# Patient Record
Sex: MALE | Race: BLACK OR AFRICAN AMERICAN
[De-identification: names, ages, dates, MRNs, and addresses within clinical notes are randomized per-mention and may not be internally consistent; named-entity substitution may affect disease eponyms.]

---

## 2017-03-25 ENCOUNTER — HOSPITAL ENCOUNTER (EMERGENCY)
Dept: HOSPITAL 8 - ED | Age: 27
LOS: 1 days | Discharge: HOME | End: 2017-03-26
Payer: MEDICAID

## 2017-03-25 VITALS — HEIGHT: 70 IN | WEIGHT: 220.68 LBS | BODY MASS INDEX: 31.59 KG/M2

## 2017-03-25 VITALS — SYSTOLIC BLOOD PRESSURE: 140 MMHG | DIASTOLIC BLOOD PRESSURE: 86 MMHG

## 2017-03-25 DIAGNOSIS — S62.314A: Primary | ICD-10-CM
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
## 2017-03-25 DIAGNOSIS — F17.200: ICD-10-CM
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
## 2017-03-25 DIAGNOSIS — S62.316A: ICD-10-CM

## 2017-03-25 DIAGNOSIS — S62.141A: ICD-10-CM

## 2017-03-25 PROCEDURE — 29125 APPL SHORT ARM SPLINT STATIC: CPT

## 2017-03-25 PROCEDURE — 99284 EMERGENCY DEPT VISIT MOD MDM: CPT

## 2017-11-14 ENCOUNTER — HOSPITAL ENCOUNTER (EMERGENCY)
Facility: MEDICAL CENTER | Age: 27
End: 2017-11-14
Attending: EMERGENCY MEDICINE
Payer: MEDICAID

## 2017-11-14 VITALS
BODY MASS INDEX: 32.53 KG/M2 | WEIGHT: 232.37 LBS | OXYGEN SATURATION: 100 % | TEMPERATURE: 97.7 F | HEART RATE: 80 BPM | DIASTOLIC BLOOD PRESSURE: 74 MMHG | HEIGHT: 71 IN | RESPIRATION RATE: 18 BRPM | SYSTOLIC BLOOD PRESSURE: 132 MMHG

## 2017-11-14 DIAGNOSIS — K08.89 PAIN, DENTAL: ICD-10-CM

## 2017-11-14 DIAGNOSIS — S02.5XXA CLOSED FRACTURE OF TOOTH, INITIAL ENCOUNTER: ICD-10-CM

## 2017-11-14 PROCEDURE — 99283 EMERGENCY DEPT VISIT LOW MDM: CPT

## 2017-11-14 PROCEDURE — A9270 NON-COVERED ITEM OR SERVICE: HCPCS | Performed by: EMERGENCY MEDICINE

## 2017-11-14 PROCEDURE — 700102 HCHG RX REV CODE 250 W/ 637 OVERRIDE(OP): Performed by: EMERGENCY MEDICINE

## 2017-11-14 RX ORDER — OXYCODONE HYDROCHLORIDE AND ACETAMINOPHEN 5; 325 MG/1; MG/1
1 TABLET ORAL ONCE
Status: COMPLETED | OUTPATIENT
Start: 2017-11-14 | End: 2017-11-14

## 2017-11-14 RX ORDER — IBUPROFEN 600 MG/1
600 TABLET ORAL ONCE
Status: COMPLETED | OUTPATIENT
Start: 2017-11-14 | End: 2017-11-14

## 2017-11-14 RX ORDER — PENICILLIN V POTASSIUM 500 MG/1
500 TABLET ORAL ONCE
Status: COMPLETED | OUTPATIENT
Start: 2017-11-14 | End: 2017-11-14

## 2017-11-14 RX ORDER — PENICILLIN V POTASSIUM 500 MG/1
500 TABLET ORAL 4 TIMES DAILY
Qty: 40 TAB | Refills: 0 | Status: SHIPPED | OUTPATIENT
Start: 2017-11-14 | End: 2017-11-24

## 2017-11-14 RX ORDER — OXYCODONE AND ACETAMINOPHEN 7.5; 325 MG/1; MG/1
1 TABLET ORAL EVERY 6 HOURS PRN
Qty: 10 TAB | Refills: 0 | Status: SHIPPED | OUTPATIENT
Start: 2017-11-14 | End: 2018-10-04

## 2017-11-14 RX ADMIN — PENICILLIN V POTASIUM 500 MG: 500 TABLET OROPHARYNGEAL at 07:59

## 2017-11-14 RX ADMIN — OXYCODONE HYDROCHLORIDE AND ACETAMINOPHEN 1 TABLET: 5; 325 TABLET ORAL at 07:59

## 2017-11-14 RX ADMIN — IBUPROFEN 600 MG: 600 TABLET, FILM COATED ORAL at 07:59

## 2017-11-14 ASSESSMENT — ENCOUNTER SYMPTOMS
VOMITING: 0
FEVER: 0
COUGH: 0

## 2017-11-14 ASSESSMENT — LIFESTYLE VARIABLES: DO YOU DRINK ALCOHOL: NO

## 2017-11-14 NOTE — ED NOTES
Chief Complaint   Patient presents with   • Dental Pain     Pt reports left side/upper jaw pain/swelling since yesterday/ pt reports to have broken tooth/ denies drainage/fever.    • Ear Pain     Explained to pt triage process, made pt aware to tell this RN of any changes/concerns, pt verbalized understanding of process and instructions given. Pt to ER lobby.

## 2017-11-14 NOTE — ED PROVIDER NOTES
ED Provider Note    ED Provider Note    Scribed for Belen Goodrich D.O. by Belen Goodrich. 11/14/2017, 7:31 AM.    Primary care provider: Pcp Pt States None  Means of arrival: POV  History obtained from: Patient  History limited by: None    CHIEF COMPLAINT  Chief Complaint   Patient presents with   • Dental Pain     Pt reports left side/upper jaw pain/swelling since yesterday/ pt reports to have broken tooth/ denies drainage/fever.    • Ear Pain       HPI  Harman Denise is a 27 y.o. male who presents to the Emergency DepartmentWith a chief complaint of dental pain. Patient's pain is on the left bottom. He states he broke that tooth sometime ago. Pain started about 48 hours ago. He actually has an appointment on January 9 at the [facility], to have this tooth pulled. He denies any fever or difficulty swallowing. He is otherwise been in his normal state of health. He was previously seen in the emergency department here in August, patient reports that he followed up with the [facility] clinic and had this dental problem resolved it was previously on the right side.    REVIEW OF SYSTEMS  Review of Systems   Constitutional: Negative for fever.   Respiratory: Negative for cough.    Gastrointestinal: Negative for vomiting.       PAST MEDICAL HISTORY   she denies any past medical history.    SURGICAL HISTORY   has a past surgical history that includes laceration repair (6/2/2014) and foreign body removal (6/2/2014).    SOCIAL HISTORY  Social History   Substance Use Topics   • Smoking status: Current Every Day Smoker     Packs/day: 1.00     Types: Cigarettes   • Smokeless tobacco: Not on file   • Alcohol use Yes      Comment: occ      History   Drug Use     Comment: marijuana       FAMILY HISTORY  No family history on file.    CURRENT MEDICATIONS  Home Medications    **Home medications have not yet been reviewed for this encounter**         ALLERGIES  No Known Allergies    PHYSICAL EXAM  VITAL SIGNS: /74   Pulse 80   Temp  "36.5 °C (97.7 °F)   Resp 18   Ht 1.803 m (5' 11\")   Wt 105.4 kg (232 lb 5.8 oz)   SpO2 100%   BMI 32.41 kg/m²   Vitals reviewed.  Constitutional: Patient is oriented to person, place, and time. Appears well-developed and well-nourished. No distress.    Head: Normocephalic and atraumatic. Mild left facial swelling  Mouth/Throat: Oropharynx is clear and moist, no exudates.  no evidence of dental abscess. The patient's left top, 1st molar, is cracked on the anterior surface. No swelling to the floor of the mouth. The patient's managing his own secretions.  Eyes: Conjunctivae are normal.   Cardiovascular: Normal rate, regular rhythm and normal heart sounds.  Pulmonary/Chest: Effort normal and breath sounds normal. No respiratory distress.  Lymphadenopathy: No cervical adenopathy.   Skin: Skin is warm and dry. No erythema. No pallor.   Psychiatric: Patient has a normal mood and affect.    COURSE & MEDICAL DECISION MAKING  Nursing notes, VS, PMSFHx reviewed in chart.    Obtained and reviewed past medical records. Last visit was August 2016 for right-sided dental pain.    7:31 AM - Patient seen and examined at bedside. Well-appearing and nontoxic. He has mild left facial swelling. No evidence of dental abscess. Managing his own secretions. Nontoxic. Normal vital signs. At this time, I think he could benefit from antibiotics. He'll be discharged home in stable condition. He has follow-up set up with a dentist of encouraged him to see them sooner if possible. Other Wise, patient will be discharged to home in stable condition.      checked, no concerning filling pattern.    The patient is referred to a primary physician for blood pressure management, diabetic screening, and for all other preventative health concerns.    DISPOSITION:  Patient will be discharged home in stable condition.    FOLLOW UP:  [facility]  [facility]  [place] [address]  [phone]      as planned, soon if possible    [facility], Emergency Dept  [address] [address]  [phone]    If symptoms worsen      OUTPATIENT MEDICATIONS:  Discharge Medication List as of 11/14/2017  8:01 AM      START taking these medications    Details   penicillin v potassium (VEETID) 500 MG Tab Take 1 Tab by mouth 4 times a day for 10 days., Disp-40 Tab, R-0, Print Rx Paper      oxycodone-acetaminophen (PERCOCET) 7.5-325 MG per tablet Take 1 Tab by mouth every 6 hours as needed for up to 10 doses., Disp-10 Tab, R-0, Print Rx Paper               FINAL IMPRESSION  1. Pain, dental    2. Closed fracture of tooth, initial encounter

## 2017-11-14 NOTE — ED NOTES
Discharge instructions given, pt verbalized understanding.  Prescription instructions given, pt verbalized understanding.  Understands NOT to drive or drink alcohol while taking narcotics.  A&ox4.  VSS.  Ambulates out of ER.  States he will not drive home.

## 2018-10-04 ENCOUNTER — APPOINTMENT (OUTPATIENT)
Dept: RADIOLOGY | Facility: MEDICAL CENTER | Age: 28
DRG: 158 | End: 2018-10-04
Attending: EMERGENCY MEDICINE
Payer: MEDICAID

## 2018-10-04 ENCOUNTER — HOSPITAL ENCOUNTER (INPATIENT)
Facility: MEDICAL CENTER | Age: 28
LOS: 2 days | DRG: 158 | End: 2018-10-06
Attending: EMERGENCY MEDICINE | Admitting: HOSPITALIST
Payer: MEDICAID

## 2018-10-04 DIAGNOSIS — K04.7 DENTAL ABSCESS: ICD-10-CM

## 2018-10-04 LAB
ALBUMIN SERPL BCP-MCNC: 4.2 G/DL (ref 3.2–4.9)
ALBUMIN/GLOB SERPL: 1 G/DL
ALP SERPL-CCNC: 94 U/L (ref 30–99)
ALT SERPL-CCNC: 19 U/L (ref 2–50)
ANION GAP SERPL CALC-SCNC: 7 MMOL/L (ref 0–11.9)
AST SERPL-CCNC: 18 U/L (ref 12–45)
BASOPHILS # BLD AUTO: 0.3 % (ref 0–1.8)
BASOPHILS # BLD: 0.02 K/UL (ref 0–0.12)
BILIRUB SERPL-MCNC: 0.4 MG/DL (ref 0.1–1.5)
BUN SERPL-MCNC: 11 MG/DL (ref 8–22)
CALCIUM SERPL-MCNC: 9.9 MG/DL (ref 8.5–10.5)
CHLORIDE SERPL-SCNC: 106 MMOL/L (ref 96–112)
CO2 SERPL-SCNC: 26 MMOL/L (ref 20–33)
CREAT SERPL-MCNC: 0.9 MG/DL (ref 0.5–1.4)
EOSINOPHIL # BLD AUTO: 0.18 K/UL (ref 0–0.51)
EOSINOPHIL NFR BLD: 2.6 % (ref 0–6.9)
ERYTHROCYTE [DISTWIDTH] IN BLOOD BY AUTOMATED COUNT: 43.1 FL (ref 35.9–50)
GLOBULIN SER CALC-MCNC: 4.3 G/DL (ref 1.9–3.5)
GLUCOSE SERPL-MCNC: 108 MG/DL (ref 65–99)
HCT VFR BLD AUTO: 41.8 % (ref 42–52)
HGB BLD-MCNC: 13.5 G/DL (ref 14–18)
IMM GRANULOCYTES # BLD AUTO: 0.02 K/UL (ref 0–0.11)
IMM GRANULOCYTES NFR BLD AUTO: 0.3 % (ref 0–0.9)
LACTATE BLD-SCNC: 1.1 MMOL/L (ref 0.5–2)
LYMPHOCYTES # BLD AUTO: 1.37 K/UL (ref 1–4.8)
LYMPHOCYTES NFR BLD: 19.4 % (ref 22–41)
MAGNESIUM SERPL-MCNC: 1.9 MG/DL (ref 1.5–2.5)
MCH RBC QN AUTO: 29.7 PG (ref 27–33)
MCHC RBC AUTO-ENTMCNC: 32.3 G/DL (ref 33.7–35.3)
MCV RBC AUTO: 92.1 FL (ref 81.4–97.8)
MONOCYTES # BLD AUTO: 0.52 K/UL (ref 0–0.85)
MONOCYTES NFR BLD AUTO: 7.4 % (ref 0–13.4)
NEUTROPHILS # BLD AUTO: 4.94 K/UL (ref 1.82–7.42)
NEUTROPHILS NFR BLD: 70 % (ref 44–72)
NRBC # BLD AUTO: 0 K/UL
NRBC BLD-RTO: 0 /100 WBC
PLATELET # BLD AUTO: 296 K/UL (ref 164–446)
PMV BLD AUTO: 10 FL (ref 9–12.9)
POTASSIUM SERPL-SCNC: 3.9 MMOL/L (ref 3.6–5.5)
PROT SERPL-MCNC: 8.5 G/DL (ref 6–8.2)
RBC # BLD AUTO: 4.54 M/UL (ref 4.7–6.1)
SODIUM SERPL-SCNC: 139 MMOL/L (ref 135–145)
TSH SERPL DL<=0.005 MIU/L-ACNC: 0.33 UIU/ML (ref 0.38–5.33)
WBC # BLD AUTO: 7.1 K/UL (ref 4.8–10.8)

## 2018-10-04 PROCEDURE — 700111 HCHG RX REV CODE 636 W/ 250 OVERRIDE (IP): Performed by: EMERGENCY MEDICINE

## 2018-10-04 PROCEDURE — 99223 1ST HOSP IP/OBS HIGH 75: CPT | Performed by: HOSPITALIST

## 2018-10-04 PROCEDURE — 84443 ASSAY THYROID STIM HORMONE: CPT

## 2018-10-04 PROCEDURE — 80053 COMPREHEN METABOLIC PANEL: CPT

## 2018-10-04 PROCEDURE — 99285 EMERGENCY DEPT VISIT HI MDM: CPT

## 2018-10-04 PROCEDURE — 700105 HCHG RX REV CODE 258: Performed by: EMERGENCY MEDICINE

## 2018-10-04 PROCEDURE — 96375 TX/PRO/DX INJ NEW DRUG ADDON: CPT

## 2018-10-04 PROCEDURE — 96365 THER/PROPH/DIAG IV INF INIT: CPT

## 2018-10-04 PROCEDURE — 70355 PANORAMIC X-RAY OF JAWS: CPT

## 2018-10-04 PROCEDURE — 700111 HCHG RX REV CODE 636 W/ 250 OVERRIDE (IP): Performed by: HOSPITALIST

## 2018-10-04 PROCEDURE — 700117 HCHG RX CONTRAST REV CODE 255: Performed by: EMERGENCY MEDICINE

## 2018-10-04 PROCEDURE — 770006 HCHG ROOM/CARE - MED/SURG/GYN SEMI*

## 2018-10-04 PROCEDURE — 70491 CT SOFT TISSUE NECK W/DYE: CPT

## 2018-10-04 PROCEDURE — 85025 COMPLETE CBC W/AUTO DIFF WBC: CPT

## 2018-10-04 PROCEDURE — 83735 ASSAY OF MAGNESIUM: CPT

## 2018-10-04 PROCEDURE — 83605 ASSAY OF LACTIC ACID: CPT

## 2018-10-04 PROCEDURE — 36415 COLL VENOUS BLD VENIPUNCTURE: CPT

## 2018-10-04 PROCEDURE — 700105 HCHG RX REV CODE 258: Performed by: HOSPITALIST

## 2018-10-04 RX ORDER — ONDANSETRON 2 MG/ML
4 INJECTION INTRAMUSCULAR; INTRAVENOUS EVERY 4 HOURS PRN
Status: DISCONTINUED | OUTPATIENT
Start: 2018-10-04 | End: 2018-10-06 | Stop reason: HOSPADM

## 2018-10-04 RX ORDER — BISACODYL 10 MG
10 SUPPOSITORY, RECTAL RECTAL
Status: DISCONTINUED | OUTPATIENT
Start: 2018-10-04 | End: 2018-10-06 | Stop reason: HOSPADM

## 2018-10-04 RX ORDER — PROMETHAZINE HYDROCHLORIDE 25 MG/1
12.5-25 TABLET ORAL EVERY 4 HOURS PRN
Status: DISCONTINUED | OUTPATIENT
Start: 2018-10-04 | End: 2018-10-06 | Stop reason: HOSPADM

## 2018-10-04 RX ORDER — SODIUM CHLORIDE 9 MG/ML
INJECTION, SOLUTION INTRAVENOUS CONTINUOUS
Status: DISCONTINUED | OUTPATIENT
Start: 2018-10-04 | End: 2018-10-06 | Stop reason: HOSPADM

## 2018-10-04 RX ORDER — KETOROLAC TROMETHAMINE 30 MG/ML
30 INJECTION, SOLUTION INTRAMUSCULAR; INTRAVENOUS EVERY 6 HOURS PRN
Status: DISCONTINUED | OUTPATIENT
Start: 2018-10-04 | End: 2018-10-04

## 2018-10-04 RX ORDER — ONDANSETRON 4 MG/1
4 TABLET, ORALLY DISINTEGRATING ORAL EVERY 4 HOURS PRN
Status: DISCONTINUED | OUTPATIENT
Start: 2018-10-04 | End: 2018-10-06 | Stop reason: HOSPADM

## 2018-10-04 RX ORDER — KETOROLAC TROMETHAMINE 30 MG/ML
30 INJECTION, SOLUTION INTRAMUSCULAR; INTRAVENOUS EVERY 6 HOURS PRN
Status: DISCONTINUED | OUTPATIENT
Start: 2018-10-04 | End: 2018-10-06 | Stop reason: HOSPADM

## 2018-10-04 RX ORDER — PROMETHAZINE HYDROCHLORIDE 12.5 MG/1
12.5-25 SUPPOSITORY RECTAL EVERY 4 HOURS PRN
Status: DISCONTINUED | OUTPATIENT
Start: 2018-10-04 | End: 2018-10-06 | Stop reason: HOSPADM

## 2018-10-04 RX ORDER — AMOXICILLIN 250 MG
2 CAPSULE ORAL 2 TIMES DAILY
Status: DISCONTINUED | OUTPATIENT
Start: 2018-10-05 | End: 2018-10-06 | Stop reason: HOSPADM

## 2018-10-04 RX ORDER — SODIUM CHLORIDE 9 MG/ML
1000 INJECTION, SOLUTION INTRAVENOUS ONCE
Status: COMPLETED | OUTPATIENT
Start: 2018-10-04 | End: 2018-10-04

## 2018-10-04 RX ORDER — ONDANSETRON 2 MG/ML
4 INJECTION INTRAMUSCULAR; INTRAVENOUS ONCE
Status: COMPLETED | OUTPATIENT
Start: 2018-10-04 | End: 2018-10-04

## 2018-10-04 RX ORDER — IBUPROFEN 200 MG
800 TABLET ORAL
Status: ON HOLD | COMMUNITY
End: 2018-10-06

## 2018-10-04 RX ORDER — MORPHINE SULFATE 4 MG/ML
4 INJECTION, SOLUTION INTRAMUSCULAR; INTRAVENOUS ONCE
Status: COMPLETED | OUTPATIENT
Start: 2018-10-04 | End: 2018-10-04

## 2018-10-04 RX ORDER — ACETAMINOPHEN 500 MG
500 TABLET ORAL EVERY 6 HOURS PRN
Status: ON HOLD | COMMUNITY
End: 2018-10-06

## 2018-10-04 RX ORDER — ACETAMINOPHEN 325 MG/1
650 TABLET ORAL EVERY 6 HOURS PRN
Status: DISCONTINUED | OUTPATIENT
Start: 2018-10-04 | End: 2018-10-06 | Stop reason: HOSPADM

## 2018-10-04 RX ORDER — POLYETHYLENE GLYCOL 3350 17 G/17G
1 POWDER, FOR SOLUTION ORAL
Status: DISCONTINUED | OUTPATIENT
Start: 2018-10-04 | End: 2018-10-06 | Stop reason: HOSPADM

## 2018-10-04 RX ADMIN — SODIUM CHLORIDE: 9 INJECTION, SOLUTION INTRAVENOUS at 19:30

## 2018-10-04 RX ADMIN — KETOROLAC TROMETHAMINE 30 MG: 30 INJECTION, SOLUTION INTRAMUSCULAR at 19:42

## 2018-10-04 RX ADMIN — IOHEXOL 75 ML: 350 INJECTION, SOLUTION INTRAVENOUS at 13:40

## 2018-10-04 RX ADMIN — AMPICILLIN AND SULBACTAM 3 G: 2; 1 INJECTION, POWDER, FOR SOLUTION INTRAVENOUS at 19:30

## 2018-10-04 RX ADMIN — SODIUM CHLORIDE 3 G: 900 INJECTION INTRAVENOUS at 13:10

## 2018-10-04 RX ADMIN — SODIUM CHLORIDE 1000 ML: 9 INJECTION, SOLUTION INTRAVENOUS at 11:33

## 2018-10-04 RX ADMIN — ONDANSETRON 4 MG: 2 INJECTION INTRAMUSCULAR; INTRAVENOUS at 11:33

## 2018-10-04 RX ADMIN — MORPHINE SULFATE 4 MG: 4 INJECTION INTRAVENOUS at 11:33

## 2018-10-04 ASSESSMENT — COPD QUESTIONNAIRES
DURING THE PAST 4 WEEKS HOW MUCH DID YOU FEEL SHORT OF BREATH: NONE/LITTLE OF THE TIME
COPD SCREENING SCORE: 2
HAVE YOU SMOKED AT LEAST 100 CIGARETTES IN YOUR ENTIRE LIFE: YES
DO YOU EVER COUGH UP ANY MUCUS OR PHLEGM?: NO/ONLY WITH OCCASIONAL COLDS OR INFECTIONS

## 2018-10-04 ASSESSMENT — ENCOUNTER SYMPTOMS
FEVER: 0
SORE THROAT: 0
CHILLS: 0

## 2018-10-04 ASSESSMENT — PAIN SCALES - GENERAL: PAINLEVEL_OUTOF10: 7

## 2018-10-04 ASSESSMENT — PATIENT HEALTH QUESTIONNAIRE - PHQ9
2. FEELING DOWN, DEPRESSED, IRRITABLE, OR HOPELESS: NOT AT ALL
SUM OF ALL RESPONSES TO PHQ9 QUESTIONS 1 AND 2: 0
1. LITTLE INTEREST OR PLEASURE IN DOING THINGS: NOT AT ALL

## 2018-10-04 ASSESSMENT — LIFESTYLE VARIABLES
DO YOU DRINK ALCOHOL: NO
EVER_SMOKED: YES
EVER_SMOKED: YES

## 2018-10-04 NOTE — ED TRIAGE NOTES
"Ambulates to triage  Chief Complaint   Patient presents with   • Oral Swelling     x2 days, L upper jaw, broken tooth     /85   Pulse (!) 117   Temp 36.1 °C (96.9 °F)   Resp 19   Ht 1.803 m (5' 11\")   Wt 107.3 kg (236 lb 8.9 oz)   SpO2 96%   BMI 32.99 kg/m²      Last took advil last night.  "

## 2018-10-04 NOTE — ED PROVIDER NOTES
ED Provider Note    Scribed for Crow Sumner M.D. by Gauri Eddy. 10/4/2018, 10:27 AM.    Primary care provider: None  Means of arrival: Walk in  History obtained from: Patient  History limited by: None    CHIEF COMPLAINT  •  Dental Pain  •  Oral Swelling    HPI  Harman Denise is a 27 y.o. male who presents to the Emergency Department for dental pain and oral swelling with an onset of two days.  Patient complains of constant dental pain to his left upper jaw and has a broken tooth in this area. He developed oral swelling to his left cheek. No improvement in symptoms with Advil. Negative for fevers, chills, sore throat, drooling, trismus or difficulty breathing.      REVIEW OF SYSTEMS  Review of Systems   Constitutional: Negative for chills and fever.   HENT: Negative for sore throat.         Positive for dental pain to left upper jaw and oral swelling to left cheek. Negative for drooling or trismus.   Respiratory:        Negative for difficulty breathing.   All other systems reviewed and are negative.    See HPI for further details. C.      PAST MEDICAL HISTORY  Patient denies a pertinent medical history.      SURGICAL HISTORY  Patient has a past surgical history that includes laceration repair (6/2/2014) and foreign body removal (6/2/2014).      SOCIAL HISTORY  Social History   Substance Use Topics   • Smoking status: Current Every Day Smoker     Packs/day: 1.00     Types: Cigarettes   • Smokeless tobacco: Never Used   • Alcohol use Yes      Comment: occ      History   Drug Use   • Types: Inhaled     Comment: marijuana       FAMILY HISTORY  History reviewed. No pertinent family history.      CURRENT MEDICATIONS  Home Medications     Reviewed by Karma Swan (Pharmacy Tech) on 10/04/18 at 1630  Med List Status: Complete   Medication Last Dose Status   acetaminophen (TYLENOL) 500 MG Tab 10/2/2018 Active   ibuprofen (MOTRIN) 200 MG Tab 10/2/2018 Active                ALLERGIES  None      PHYSICAL  "EXAM  VITAL SIGNS: /85   Pulse (!) 117   Temp 36.1 °C (96.9 °F)   Resp 19   Ht 1.803 m (5' 11\")   Wt 107.3 kg (236 lb 8.9 oz)   SpO2 96%   BMI 32.99 kg/m²     Constitutional:  Mild distress  HENT: Caries lesions to posterior mandible, Anterior molar on left maxilla is caries and tender with swelling and pointing in that area, Dry mucous membranes  Eyes:  No conjunctivitis or icterus  Neck:  trachea is midline, no palpable thyroid  Lymphatic:  No cervical lymphadenopathy  Cardiovascular:  Tachycardic, Regular rhythm, no murmurs  Thorax & Lungs:  Normal breath sounds, no rhonchi  Abdomen:  Soft, Non-tender  Skin:.  no rash  Back:  Non-tender, no CVA tenderness  Extremities:   no edema  Vascular:  symmetric radial pulse  Neurologic:  Normal gross motor      LABS  Labs Reviewed   CBC WITH DIFFERENTIAL - Abnormal; Notable for the following:        Result Value    RBC 4.54 (*)     Hemoglobin 13.5 (*)     Hematocrit 41.8 (*)     MCHC 32.3 (*)     Lymphocytes 19.40 (*)     All other components within normal limits   COMP METABOLIC PANEL - Abnormal; Notable for the following:     Glucose 108 (*)     Total Protein 8.5 (*)     Globulin 4.3 (*)     All other components within normal limits   LACTIC ACID   ESTIMATED GFR     All labs reviewed by me.      RADIOLOGY  [id number]   Final Result         1. Fairly large periapical lucency involving one of the left mandibular molars which is fractured.   2. Left maxillary apical lucency is better characterized on recent CT.   3. No mandibular fractures.      CT-SOFT TISSUE NECK WITH   Final Result      1. Periapical lucency involving the first left maxillary molar, with associated 1.2 cm subperiosteal abscess. Associated swelling of the left buccal space.   2. Several additional periapical lucencies in the maxillary and mandibular teeth. Several cavities.   3. No pharyngeal mucosal lesions.        The radiologist's interpretation of all radiological studies have " been reviewed by me.      COURSE & MEDICAL DECISION MAKING  Pertinent Labs & Imaging studies reviewed. (See chart for details)    10:28 AM Patient seen and examined at bedside. Oral swelling rule out facial or neck abscess.    Initial orders in the Emergency Department included CT soft tissue neck and laboratory testing: CBC with differential, CMP, estimated GFR and lactic acid.  Initial treatment in the Emergency Department included 4 mg of Zofran IV and 4 mg of Morphine IV. Intravenous fluids will be administered for dry mucous membranes and tachycardia.    12:55 PM Patient will be treated with 3 g of Unasyn IV.    2:39 PM On repeat evaluation, patient is resting comfortably. Heart rate improved after IV fluids; vital signs stable. Lab and CT results were discussed. Plan to consult with oral surgery. NPO since 6:00 AM today.    3:05 PM Paged Oral Surgery.    Consulted with Dr. Fuller, Oral Surgery, regarding the patient's presentation and diagnotic results.       Medical Decision Making:  Patient has abscess poor dentition.  Outpatient follow-up will be difficult to obtain and he feels poorly.  Of contacted oral surgery patient will be admitted of contacted internal medicine.  Patient is given Unasyn and maintenance fluids.  He is being kept n.p.o.      DISPOSITION  Patient will be discharged home in stable condition.      FOLLOW UP  No follow-up provider specified.    The patient is referred to a primary physician for blood pressure management, diabetic screening, and for all other preventative health concerns.      OUTPATIENT MEDICATIONS  New Prescriptions    No medications on file       DIAGNOSIS  1. Dental abscess           Gauri CHAVIRA (Jimenez), am scribing for, and in the presence of, Crow Sumner M.D.    Electronically signed by: Gauri Eddy (Jimenez), 10/4/2018    Crow CHAVIRA M.D. personally performed the services described in this documentation, as scribed by Gauri Eddy in my  presence, and it is both accurate and complete. C.    The note accurately reflects work and decisions made by me.  Crow Sumner  10/4/2018  4:53 PM

## 2018-10-05 PROBLEM — K04.7 DENTAL ABSCESS: Status: ACTIVE | Noted: 2018-10-05

## 2018-10-05 LAB
ANION GAP SERPL CALC-SCNC: 9 MMOL/L (ref 0–11.9)
BUN SERPL-MCNC: 12 MG/DL (ref 8–22)
CALCIUM SERPL-MCNC: 8.9 MG/DL (ref 8.5–10.5)
CHLORIDE SERPL-SCNC: 106 MMOL/L (ref 96–112)
CO2 SERPL-SCNC: 24 MMOL/L (ref 20–33)
CREAT SERPL-MCNC: 0.98 MG/DL (ref 0.5–1.4)
ERYTHROCYTE [DISTWIDTH] IN BLOOD BY AUTOMATED COUNT: 43.9 FL (ref 35.9–50)
GLUCOSE SERPL-MCNC: 107 MG/DL (ref 65–99)
HCT VFR BLD AUTO: 39.7 % (ref 42–52)
HGB BLD-MCNC: 13 G/DL (ref 14–18)
MCH RBC QN AUTO: 30.9 PG (ref 27–33)
MCHC RBC AUTO-ENTMCNC: 32.7 G/DL (ref 33.7–35.3)
MCV RBC AUTO: 94.3 FL (ref 81.4–97.8)
PLATELET # BLD AUTO: 238 K/UL (ref 164–446)
PMV BLD AUTO: 10.2 FL (ref 9–12.9)
POTASSIUM SERPL-SCNC: 3.7 MMOL/L (ref 3.6–5.5)
RBC # BLD AUTO: 4.21 M/UL (ref 4.7–6.1)
SODIUM SERPL-SCNC: 139 MMOL/L (ref 135–145)
WBC # BLD AUTO: 5.8 K/UL (ref 4.8–10.8)

## 2018-10-05 PROCEDURE — 0C9XXZ1 DRAINAGE OF LOWER TOOTH, EXTERNAL APPROACH, MULTIPLE: ICD-10-PCS | Performed by: DENTIST

## 2018-10-05 PROCEDURE — 85027 COMPLETE CBC AUTOMATED: CPT

## 2018-10-05 PROCEDURE — 700101 HCHG RX REV CODE 250

## 2018-10-05 PROCEDURE — A9270 NON-COVERED ITEM OR SERVICE: HCPCS | Performed by: HOSPITALIST

## 2018-10-05 PROCEDURE — 700111 HCHG RX REV CODE 636 W/ 250 OVERRIDE (IP)

## 2018-10-05 PROCEDURE — 700102 HCHG RX REV CODE 250 W/ 637 OVERRIDE(OP): Performed by: HOSPITALIST

## 2018-10-05 PROCEDURE — 0C9WXZ1 DRAINAGE OF UPPER TOOTH, EXTERNAL APPROACH, MULTIPLE: ICD-10-PCS | Performed by: DENTIST

## 2018-10-05 PROCEDURE — 700102 HCHG RX REV CODE 250 W/ 637 OVERRIDE(OP): Performed by: FAMILY MEDICINE

## 2018-10-05 PROCEDURE — 0CDXXZ1 EXTRACTION OF LOWER TOOTH, MULTIPLE, EXTERNAL APPROACH: ICD-10-PCS | Performed by: DENTIST

## 2018-10-05 PROCEDURE — 99232 SBSQ HOSP IP/OBS MODERATE 35: CPT | Performed by: FAMILY MEDICINE

## 2018-10-05 PROCEDURE — 160002 HCHG RECOVERY MINUTES (STAT): Performed by: DENTIST

## 2018-10-05 PROCEDURE — A6403 STERILE GAUZE>16 <= 48 SQ IN: HCPCS | Performed by: DENTIST

## 2018-10-05 PROCEDURE — 160035 HCHG PACU - 1ST 60 MINS PHASE I: Performed by: DENTIST

## 2018-10-05 PROCEDURE — A9270 NON-COVERED ITEM OR SERVICE: HCPCS | Performed by: FAMILY MEDICINE

## 2018-10-05 PROCEDURE — 700105 HCHG RX REV CODE 258: Performed by: HOSPITALIST

## 2018-10-05 PROCEDURE — 700111 HCHG RX REV CODE 636 W/ 250 OVERRIDE (IP): Performed by: ANESTHESIOLOGY

## 2018-10-05 PROCEDURE — 160027 HCHG SURGERY MINUTES - 1ST 30 MINS LEVEL 2: Performed by: DENTIST

## 2018-10-05 PROCEDURE — 160048 HCHG OR STATISTICAL LEVEL 1-5: Performed by: DENTIST

## 2018-10-05 PROCEDURE — 700111 HCHG RX REV CODE 636 W/ 250 OVERRIDE (IP): Performed by: HOSPITALIST

## 2018-10-05 PROCEDURE — 0CDWXZ1 EXTRACTION OF UPPER TOOTH, MULTIPLE, EXTERNAL APPROACH: ICD-10-PCS | Performed by: DENTIST

## 2018-10-05 PROCEDURE — 501838 HCHG SUTURE GENERAL: Performed by: DENTIST

## 2018-10-05 PROCEDURE — 501411 HCHG SPONGE, BABY LAP W/O RINGS: Performed by: DENTIST

## 2018-10-05 PROCEDURE — 36415 COLL VENOUS BLD VENIPUNCTURE: CPT

## 2018-10-05 PROCEDURE — 770006 HCHG ROOM/CARE - MED/SURG/GYN SEMI*

## 2018-10-05 PROCEDURE — 160038 HCHG SURGERY MINUTES - EA ADDL 1 MIN LEVEL 2: Performed by: DENTIST

## 2018-10-05 PROCEDURE — 80048 BASIC METABOLIC PNL TOTAL CA: CPT

## 2018-10-05 PROCEDURE — 160036 HCHG PACU - EA ADDL 30 MINS PHASE I: Performed by: DENTIST

## 2018-10-05 PROCEDURE — 160009 HCHG ANES TIME/MIN: Performed by: DENTIST

## 2018-10-05 RX ORDER — OXYCODONE HYDROCHLORIDE 5 MG/1
5 TABLET ORAL
Status: DISCONTINUED | OUTPATIENT
Start: 2018-10-05 | End: 2018-10-05 | Stop reason: HOSPADM

## 2018-10-05 RX ORDER — OXYCODONE HYDROCHLORIDE 10 MG/1
10 TABLET ORAL
Status: DISCONTINUED | OUTPATIENT
Start: 2018-10-05 | End: 2018-10-05 | Stop reason: HOSPADM

## 2018-10-05 RX ORDER — LIDOCAINE HYDROCHLORIDE AND EPINEPHRINE BITARTRATE 20; .01 MG/ML; MG/ML
INJECTION, SOLUTION SUBCUTANEOUS
Status: DISCONTINUED | OUTPATIENT
Start: 2018-10-05 | End: 2018-10-05 | Stop reason: HOSPADM

## 2018-10-05 RX ORDER — OXYCODONE HCL 5 MG/5 ML
5 SOLUTION, ORAL ORAL
Status: DISCONTINUED | OUTPATIENT
Start: 2018-10-05 | End: 2018-10-05 | Stop reason: HOSPADM

## 2018-10-05 RX ORDER — NICOTINE 21 MG/24HR
14 PATCH, TRANSDERMAL 24 HOURS TRANSDERMAL
Status: DISCONTINUED | OUTPATIENT
Start: 2018-10-05 | End: 2018-10-06 | Stop reason: HOSPADM

## 2018-10-05 RX ORDER — ONDANSETRON 2 MG/ML
4 INJECTION INTRAMUSCULAR; INTRAVENOUS
Status: DISCONTINUED | OUTPATIENT
Start: 2018-10-05 | End: 2018-10-05 | Stop reason: HOSPADM

## 2018-10-05 RX ORDER — OXYCODONE HCL 5 MG/5 ML
10 SOLUTION, ORAL ORAL
Status: DISCONTINUED | OUTPATIENT
Start: 2018-10-05 | End: 2018-10-05 | Stop reason: HOSPADM

## 2018-10-05 RX ORDER — MIDAZOLAM HYDROCHLORIDE 1 MG/ML
1 INJECTION INTRAMUSCULAR; INTRAVENOUS
Status: DISCONTINUED | OUTPATIENT
Start: 2018-10-05 | End: 2018-10-05 | Stop reason: HOSPADM

## 2018-10-05 RX ORDER — HYDROCODONE BITARTRATE AND ACETAMINOPHEN 10; 325 MG/1; MG/1
1 TABLET ORAL EVERY 6 HOURS PRN
Status: DISCONTINUED | OUTPATIENT
Start: 2018-10-05 | End: 2018-10-06 | Stop reason: HOSPADM

## 2018-10-05 RX ORDER — DIPHENHYDRAMINE HYDROCHLORIDE 50 MG/ML
12.5 INJECTION INTRAMUSCULAR; INTRAVENOUS
Status: DISCONTINUED | OUTPATIENT
Start: 2018-10-05 | End: 2018-10-05 | Stop reason: HOSPADM

## 2018-10-05 RX ORDER — MEPERIDINE HYDROCHLORIDE 25 MG/ML
12.5 INJECTION INTRAMUSCULAR; INTRAVENOUS; SUBCUTANEOUS
Status: DISCONTINUED | OUTPATIENT
Start: 2018-10-05 | End: 2018-10-05 | Stop reason: HOSPADM

## 2018-10-05 RX ORDER — SODIUM CHLORIDE, SODIUM LACTATE, POTASSIUM CHLORIDE, CALCIUM CHLORIDE 600; 310; 30; 20 MG/100ML; MG/100ML; MG/100ML; MG/100ML
INJECTION, SOLUTION INTRAVENOUS CONTINUOUS
Status: DISCONTINUED | OUTPATIENT
Start: 2018-10-05 | End: 2018-10-05 | Stop reason: HOSPADM

## 2018-10-05 RX ORDER — HALOPERIDOL 5 MG/ML
1 INJECTION INTRAMUSCULAR
Status: DISCONTINUED | OUTPATIENT
Start: 2018-10-05 | End: 2018-10-05 | Stop reason: HOSPADM

## 2018-10-05 RX ORDER — HYDROMORPHONE HYDROCHLORIDE 2 MG/ML
0.2 INJECTION, SOLUTION INTRAMUSCULAR; INTRAVENOUS; SUBCUTANEOUS
Status: DISCONTINUED | OUTPATIENT
Start: 2018-10-05 | End: 2018-10-05 | Stop reason: HOSPADM

## 2018-10-05 RX ORDER — HYDROMORPHONE HYDROCHLORIDE 2 MG/ML
0.4 INJECTION, SOLUTION INTRAMUSCULAR; INTRAVENOUS; SUBCUTANEOUS
Status: DISCONTINUED | OUTPATIENT
Start: 2018-10-05 | End: 2018-10-05 | Stop reason: HOSPADM

## 2018-10-05 RX ORDER — HYDROMORPHONE HYDROCHLORIDE 2 MG/ML
0.1 INJECTION, SOLUTION INTRAMUSCULAR; INTRAVENOUS; SUBCUTANEOUS
Status: DISCONTINUED | OUTPATIENT
Start: 2018-10-05 | End: 2018-10-05 | Stop reason: HOSPADM

## 2018-10-05 RX ORDER — MAGNESIUM HYDROXIDE 1200 MG/15ML
LIQUID ORAL
Status: COMPLETED | OUTPATIENT
Start: 2018-10-05 | End: 2018-10-05

## 2018-10-05 RX ADMIN — HYDROMORPHONE HYDROCHLORIDE 0.4 MG: 2 INJECTION INTRAMUSCULAR; INTRAVENOUS; SUBCUTANEOUS at 17:28

## 2018-10-05 RX ADMIN — HYDROMORPHONE HYDROCHLORIDE 0.4 MG: 2 INJECTION INTRAMUSCULAR; INTRAVENOUS; SUBCUTANEOUS at 17:35

## 2018-10-05 RX ADMIN — HYDROCODONE BITARTRATE AND ACETAMINOPHEN 1 TABLET: 10; 325 TABLET ORAL at 21:05

## 2018-10-05 RX ADMIN — AMPICILLIN AND SULBACTAM 3 G: 2; 1 INJECTION, POWDER, FOR SOLUTION INTRAVENOUS at 18:19

## 2018-10-05 RX ADMIN — FENTANYL CITRATE 50 MCG: 50 INJECTION, SOLUTION INTRAMUSCULAR; INTRAVENOUS at 17:03

## 2018-10-05 RX ADMIN — AMPICILLIN AND SULBACTAM 3 G: 2; 1 INJECTION, POWDER, FOR SOLUTION INTRAVENOUS at 04:39

## 2018-10-05 RX ADMIN — SENNOSIDES-DOCUSATE SODIUM TAB 8.6-50 MG 2 TABLET: 8.6-5 TAB at 18:19

## 2018-10-05 RX ADMIN — AMPICILLIN AND SULBACTAM 3 G: 2; 1 INJECTION, POWDER, FOR SOLUTION INTRAVENOUS at 00:45

## 2018-10-05 RX ADMIN — AMPICILLIN AND SULBACTAM 3 G: 2; 1 INJECTION, POWDER, FOR SOLUTION INTRAVENOUS at 13:57

## 2018-10-05 RX ADMIN — NICOTINE 14 MG: 14 PATCH, EXTENDED RELEASE TRANSDERMAL at 09:14

## 2018-10-05 RX ADMIN — HYDROCODONE BITARTRATE AND ACETAMINOPHEN 1 TABLET: 10; 325 TABLET ORAL at 09:14

## 2018-10-05 RX ADMIN — FENTANYL CITRATE 50 MCG: 50 INJECTION, SOLUTION INTRAMUSCULAR; INTRAVENOUS at 16:53

## 2018-10-05 RX ADMIN — AMPICILLIN AND SULBACTAM 3 G: 2; 1 INJECTION, POWDER, FOR SOLUTION INTRAVENOUS at 23:21

## 2018-10-05 RX ADMIN — HYDROMORPHONE HYDROCHLORIDE 0.2 MG: 2 INJECTION, SOLUTION INTRAMUSCULAR; INTRAVENOUS; SUBCUTANEOUS at 17:40

## 2018-10-05 ASSESSMENT — ENCOUNTER SYMPTOMS
BRUISES/BLEEDS EASILY: 0
NECK PAIN: 0
SINUS PAIN: 0
DEPRESSION: 0
FEVER: 0
DOUBLE VISION: 0
MYALGIAS: 0
PALPITATIONS: 0
ORTHOPNEA: 0
CHILLS: 0
VOMITING: 0
WEIGHT LOSS: 0
DIARRHEA: 0
EYE REDNESS: 0
NAUSEA: 0
SPUTUM PRODUCTION: 0
HEARTBURN: 0
ABDOMINAL PAIN: 0
HEMOPTYSIS: 0
COUGH: 0
HEADACHES: 0
DIZZINESS: 0
BLURRED VISION: 0

## 2018-10-05 ASSESSMENT — COGNITIVE AND FUNCTIONAL STATUS - GENERAL
MOBILITY SCORE: 24
SUGGESTED CMS G CODE MODIFIER MOBILITY: CH
SUGGESTED CMS G CODE MODIFIER DAILY ACTIVITY: CH
DAILY ACTIVITIY SCORE: 24

## 2018-10-05 ASSESSMENT — PAIN SCALES - GENERAL
PAINLEVEL_OUTOF10: ASSUMED PAIN PRESENT
PAINLEVEL_OUTOF10: 0
PAINLEVEL_OUTOF10: 7
PAINLEVEL_OUTOF10: ASSUMED PAIN PRESENT
PAINLEVEL_OUTOF10: 5
PAINLEVEL_OUTOF10: 7
PAINLEVEL_OUTOF10: 2
PAINLEVEL_OUTOF10: 7
PAINLEVEL_OUTOF10: ASSUMED PAIN PRESENT
PAINLEVEL_OUTOF10: ASSUMED PAIN PRESENT
PAINLEVEL_OUTOF10: 8

## 2018-10-05 NOTE — H&P
Hospital Medicine History & Physical Note    Date of Service  10/4/2018    Primary Care Physician  Pcp Pt States None    Consultants  Oral surgeon    Code Status  Full code    Chief Complaint  Swelling on his left face    History of Presenting Illness  27 y.o. male who presented 10/4/2018 with no significant past medical history is coming today complaining of left maxillary area pain and swelling also swelling on the same area inside his mouth, patient is been able to eat, denies any shortness of breath, denies any stridor, patient has cavities and broken teeth, patient has tenderness on palpation, he denies any fever chills shortness of breath, no coughing, no headaches, no vision changes, no nausea vomiting diarrhea or constipation, no abdominal pain, no dysuria, patient was started on IV antibiotics, ER physician consulted oral surgeon and plan for surgical intervention later today or tomorrow in the morning, patient is being admitted to the hospital today, patient expressed understanding of his plan of care and agree with it, all questions answered.    Review of Systems  Review of Systems   Constitutional: Negative for chills and fever.   HENT: Negative for congestion and sinus pain.    Eyes: Negative for blurred vision and redness.   Respiratory: Negative for cough and hemoptysis.    Cardiovascular: Negative for chest pain and palpitations.   Gastrointestinal: Negative for heartburn and nausea.   Genitourinary: Negative for dysuria and urgency.   Musculoskeletal: Negative for myalgias and neck pain.   Skin: Negative for itching.   Neurological: Negative for dizziness and headaches.   Endo/Heme/Allergies: Does not bruise/bleed easily.   Psychiatric/Behavioral: Negative for depression and suicidal ideas.       Past Medical History   has no past medical history on file.    Surgical History   has a past surgical history that includes laceration repair (6/2/2014) and foreign body removal (6/2/2014).     Family  History  Reviewed, noncontributory to case    Social History   reports that he has been smoking Cigarettes.  He has been smoking about 1.00 pack per day. He has never used smokeless tobacco. He reports that he drinks alcohol. He reports that he uses drugs, including Inhaled.    Allergies  No Known Allergies    Medications  Prior to Admission Medications   Prescriptions Last Dose Informant Patient Reported? Taking?   acetaminophen (TYLENOL) 500 MG Tab 10/2/2018 at [facility] Patient Yes Yes   Sig: Take 500 mg by mouth every 6 hours as needed for Mild Pain.   ibuprofen (MOTRIN) 200 MG Tab 10/2/2018 at [facility] Patient Yes Yes   Sig: Take 800 mg by mouth 1 time daily as needed for Mild Pain.      Facility-Administered Medications: None       Physical Exam  Temp:  [36.1 °C (96.9 °F)] 36.1 °C (96.9 °F)  Pulse:  [] 72  Resp:  [19] 19  BP: (143)/(85) 143/85    Physical Exam   Constitutional: He is oriented to person, place, and time. He appears well-nourished. No distress.   HENT:   Head: Atraumatic. Macrocephalic.   Mouth/Throat: Mucous membranes are normal. Dental abscesses (Left maxillary area) and dental caries present. No oropharyngeal exudate.   Swelling on his left face, tenderness on palpation maxillary area    Eyes: Conjunctivae are normal. Right eye exhibits no discharge. Left eye exhibits no discharge. No scleral icterus.   Neck: Normal range of motion. Neck supple. No JVD present.   Cardiovascular: Normal heart sounds.  Exam reveals no friction rub.    Pulmonary/Chest: Effort normal and breath sounds normal. No stridor. No respiratory distress. He has no wheezes. He has no rales.   Abdominal: Soft. Bowel sounds are normal. He exhibits no distension. There is no tenderness. There is no rebound.   Musculoskeletal: Normal range of motion. He exhibits no edema or tenderness.   Lymphadenopathy:     He has no cervical adenopathy.   Neurological: He is alert and oriented to person, place, and time. He exhibits normal muscle  tone.   Skin: Skin is dry. No erythema.   Psychiatric: He has a normal mood and affect.   Nursing note and vitals reviewed.      Laboratory:  Recent Labs      10/04/18   1135   WBC  7.1   RBC  4.54*   HEMOGLOBIN  13.5*   HEMATOCRIT  41.8*   MCV  92.1   MCH  29.7   MCHC  32.3*   RDW  43.1   PLATELETCT  296   MPV  10.0     Recent Labs      10/04/18   1135   SODIUM  139   POTASSIUM  3.9   CHLORIDE  106   CO2  26   GLUCOSE  108*   BUN  11   CREATININE  0.90   CALCIUM  9.9     Recent Labs      10/04/18   1135   ALTSGPT  19   ASTSGOT  18   ALKPHOSPHAT  94   TBILIRUBIN  0.4   GLUCOSE  108*                 No results for input(s): TROPONINI in the last 72 hours.    Urinalysis:    No results found     Imaging:  [id number]   Final Result         1. Fairly large periapical lucency involving one of the left mandibular molars which is fractured.   2. Left maxillary apical lucency is better characterized on recent CT.   3. No mandibular fractures.      CT-SOFT TISSUE NECK WITH   Final Result      1. Periapical lucency involving the first left maxillary molar, with associated 1.2 cm subperiosteal abscess. Associated swelling of the left buccal space.   2. Several additional periapical lucencies in the maxillary and mandibular teeth. Several cavities.   3. No pharyngeal mucosal lesions.            Assessment/Plan:  I anticipate this patient will require at least two midnights for appropriate medical management, necessitating inpatient admission.    Dental abscess- (present on admission)   Assessment & Plan    Left upper maxilla, started on pain medication, IV antibiotics, oral surgeon consulted for possible surgical intervention in the morning, will keep patient n.p.o. after midnight.  Had panoramic x-ray showing  [id number]   Final Result         1. Fairly large periapical lucency involving one of the left mandibular molars which is fractured.   2. Left maxillary apical lucency is better characterized on  recent CT.   3. No mandibular fractures.      CT-SOFT TISSUE NECK WITH   Final Result      1. Periapical lucency involving the first left maxillary molar, with associated 1.2 cm subperiosteal abscess. Associated swelling of the left buccal space.   2. Several additional periapical lucencies in the maxillary and mandibular teeth. Several cavities.                     VTE prophylaxis: scd

## 2018-10-05 NOTE — CARE PLAN
Problem: Communication  Goal: The ability to communicate needs accurately and effectively will improve  Outcome: PROGRESSING AS EXPECTED  Pt is able to verbalize needs and uses call light appropriately.     Problem: Pain Management  Goal: Pain level will decrease to patient's comfort goal  Outcome: PROGRESSING AS EXPECTED  Pt feels pain is well controlled with current pain medication regimen.

## 2018-10-05 NOTE — ASSESSMENT & PLAN NOTE
Status post oral surgery.  Operative report is reviewed awaiting oral maxillofacial surgery recommendation for discharge planning.  Continue IV antibiotics for now.

## 2018-10-05 NOTE — OR SURGEON
Immediate Post OP Note    PreOp Diagnosis: Abscessed teeth numbers 3, 5, 7, 14, 17,18, 19, left buccal space abscess    PostOp Diagnosis: Same    Procedure(s):  Left buccal space abscess ABSCESS INCISION AND DRAINAGE- UPPER MAXILLARY - Wound Class: Dirty or Infected  Extraction of teeth Abscessed teeth numbers 3, 5, 7, 14, 17,18, 19, left buccal space abscess    Surgeon(s):  Oren Fuller DMD, M.D.    Anesthesiologist/Type of Anesthesia:  Anesthesiologist: Ramos Hewitt M.D./General    Surgical Staff:  Circulator: Jagruti Ventura R.N.  Scrub Person: Dary Aquino    Specimens removed if any:  * No specimens in log *    Estimated Blood Loss: 20ml    Findings: consistent with diagnosis    Complications: none        10/5/2018 4:41 PM Oren Fuller DMD, M.D.

## 2018-10-05 NOTE — PROGRESS NOTES
[facility] Hospitalist Progress Note    Date of Service: 10/5/2018    Chief Complaint  27 y.o. male admitted 10/4/2018 with dental abscess and left upper maxilla involvement.    Interval Problem Update  Complain about being n.p.o. since he overnight.  Complains of left maxillary pain.  No dysphagia no odynophagia.  No fever.    Consultants/Specialty  Oral maxillofacial surgery    Disposition  Home after surgery        Review of Systems   Constitutional: Negative for chills, fever and weight loss.   HENT: Negative for ear discharge, hearing loss, nosebleeds and tinnitus.    Eyes: Negative for blurred vision and double vision.   Respiratory: Negative for cough, hemoptysis and sputum production.    Cardiovascular: Negative for chest pain, palpitations and orthopnea.   Gastrointestinal: Negative for abdominal pain, diarrhea, heartburn, nausea and vomiting.   Genitourinary: Negative for dysuria, frequency and urgency.   Skin: Negative for itching and rash.      Physical Exam  Laboratory/Imaging   Hemodynamics  Temp (24hrs), Av.8 °C (98.2 °F), Min:36.5 °C (97.7 °F), Max:37.3 °C (99.1 °F)   Temperature: 36.5 °C (97.7 °F)  Pulse  Av.4  Min: 63  Max: 117    Blood Pressure: 137/65, NIBP: 116/57      Respiratory      Respiration: 17, Pulse Oximetry: 100 %, O2 Daily Delivery Respiratory : Room Air with O2 Available        RUL Breath Sounds: Clear, RML Breath Sounds: Clear, RLL Breath Sounds: Diminished, HILARIO Breath Sounds: Clear, LLL Breath Sounds: Diminished    Fluids  No intake or output data in the 24 hours ending 10/05/18 1030    Nutrition  Orders Placed This Encounter   Procedures   • Diet NPO     Standing Status:   Standing     Number of Occurrences:   1     Order Specific Question:   Restrict to:     Answer:   Strict [1]     Physical Exam   Constitutional: He appears well-developed and well-nourished. No distress.   HENT:   Head: Normocephalic and atraumatic.   Right Ear: External ear normal.   Swelling of the left  side of the face.  Dental caries in the lower molars and premolars.  Facial asymmetry due to swelling on the left side of the face.  Tender to palpation.   Neck: Normal range of motion. Neck supple. No tracheal deviation present. No thyromegaly present.   Cardiovascular: Normal rate, regular rhythm, normal heart sounds and intact distal pulses.  Exam reveals no friction rub.    No murmur heard.  Pulmonary/Chest: Effort normal and breath sounds normal. No respiratory distress. He has no wheezes. He has no rales.   Abdominal: Soft. Bowel sounds are normal. He exhibits no distension. There is no tenderness. There is no rebound.   Skin: He is not diaphoretic.       Recent Labs      10/04/18   1135  10/05/18   0254   WBC  7.1  5.8   RBC  4.54*  4.21*   HEMOGLOBIN  13.5*  13.0*   HEMATOCRIT  41.8*  39.7*   MCV  92.1  94.3   MCH  29.7  30.9   MCHC  32.3*  32.7*   RDW  43.1  43.9   PLATELETCT  296  238   MPV  10.0  10.2     Recent Labs      10/04/18   1135  10/05/18   0254   SODIUM  139  139   POTASSIUM  3.9  3.7   CHLORIDE  106  106   CO2  26  24   GLUCOSE  108*  107*   BUN  11  12   CREATININE  0.90  0.98   CALCIUM  9.9  8.9                      Assessment/Plan     Dental abscess- (present on admission)   Assessment & Plan       I was told by patient's nurse that patient has been seen by surgery in the morning and there is a operative intervention planned at 3 PM today.  Continue IV antibiotics.  Continue n.p.o. status.  Patient is very upset about being n.p.o.  Patient had been agitated with nursing staff.  Also agitated about why he has been kept n.p.o.  I explained to the patient that he is going to go for surgery.  Patient appears still frustrated.  Add oral Norco for pain management.  Follow-up postoperatively.          Quality-Core Measures   Atkins catheter::  No Atkins  DVT prophylaxis pharmacological::  Enoxaparin (Lovenox)  Antibiotics:  Treating active infection/contamination beyond 24 hours perioperative  coverage

## 2018-10-05 NOTE — CONSULTS
MAXILLOFACIAL SURGERY NOTE    DATE OF CONSULTATION:  10/5/2018    CHIEF COMPLAINT:  Pain and swelling left face    HISTORY OF PRESENT ILLNESS:  This is a 27 y.o. male who has a history of pain and swelling in the left upper jaw.  He has had worsening symptoms over the last several days until male came to the hospital.  He moved to town from [place] and does not have a dentist.  Facial swelling was starting to cause issues with his mouth opening.   No sob, no difficulty breathing, no chest pain.    Past Medical/ Family / Social history (PFSH):   Past Medical History:   Active Ambulatory Problems     Diagnosis Date Noted   • Fall against object 06/02/2014     Resolved Ambulatory Problems     Diagnosis Date Noted   • No Resolved Ambulatory Problems     No Additional Past Medical History     History reviewed. No pertinent past medical history.      Past Surgical History:   None    Current Outpatient Medications:   Current Facility-Administered Medications   Medication Dose   • [MAR Hold] nicotine (NICODERM) 14 MG/24HR 14 mg  14 mg   • [MAR Hold] HYDROcodone/acetaminophen (NORCO)  MG per tablet 1 Tab  1 Tab   • [MAR Hold] enoxaparin (LOVENOX) inj 40 mg  40 mg   • [MAR Hold] senna-docusate (PERICOLACE or SENOKOT S) 8.6-50 MG per tablet 2 Tab  2 Tab    And   • [MAR Hold] polyethylene glycol/lytes (MIRALAX) PACKET 1 Packet  1 Packet    And   • [MAR Hold] magnesium hydroxide (MILK OF MAGNESIA) suspension 30 mL  30 mL    And   • [MAR Hold] bisacodyl (DULCOLAX) suppository 10 mg  10 mg   • [MAR Hold] Respiratory Care per Protocol     • NS infusion     • [MAR Hold] ondansetron (ZOFRAN) syringe/vial injection 4 mg  4 mg   • [MAR Hold] ondansetron (ZOFRAN ODT) dispertab 4 mg  4 mg   • [MAR Hold] promethazine (PHENERGAN) tablet 12.5-25 mg  12.5-25 mg   • [MAR Hold] promethazine (PHENERGAN) suppository 12.5-25 mg  12.5-25 mg   • [MAR Hold] prochlorperazine (COMPAZINE) injection 5-10 mg  5-10 mg   • [MAR Hold] acetaminophen  (TYLENOL) tablet 650 mg  650 mg   • [MAR Hold] ampicillin/sulbactam (UNASYN) 3 g in  mL IVPB  3 g   • [MAR Hold] ketorolac (TORADOL) injection 30 mg  30 mg       Medication Allergy/Sensitivities:   No Known Allergies     Family History:   Denies hx of cancer, DM, HTN     Social History:   smoker    Allergies:  No Known Allergies    REVIEW OF SYSTEMS:  Denies CP, Denies SOB, Denies any Changes in vision, no nausea, no GI upset, 12 point ROS was done and is negative per the HPI.  Facial pain.     PHYSICAL EXAMINATION:  VITAL SIGNS:  Stable.  male is afebrile.  GENERAL:  male is in no acute distress.  HEENT:  Head is normocephalic and atraumatic.   EARS: TM clear.    EYES: Extraocular   muscles are intact with no entrapment.  Pupils equally round and reactive to light and   accommodation.    NOSE: Nares are patent bilateral with no crepitus of the nasal bones  ORAL:   45 mm mouth opening.  no deviation upon opening.  Dentition is in poor repair.  Fractured and retained root tips in number 3, 7, 14, 17, 18, 19, Buccal space abscess left side.   THROAT:  Mallampati 1  HEART:  His heart was regular rate and rhythm.  LUNGS:  Clear to auscultation bilaterally.    X-RAYS:  Normal trabecular pattern of bone    Upper facial 1/3rd:    Frontal bone/sinus:    Middle facial 1/3rd:   Zygoma's/Z.arch:   Nose:   Orbits:   ERIKA:  Lower facial 1/3rd:    Mandible:   Maxilla: abscess lower left around molars 17-19, and tooth #14      ASSESSMENT:  This is a 27 y.o. male who has left buccal space abscess.  Abscessed teeth 3, 7, 14, 17, 18, 19        PLAN:  Extraction of abscessed teeth.  Incision and drainage of left buccal space abscess intraorally.     Dispo: can go home tomorrow or tonight  Abx: 1 week  Pain meds: prn  Follow up or OR instructions: prn     _______________________________     ABDELRAHMAN ZAIDI DMD, MD

## 2018-10-05 NOTE — PROGRESS NOTES
Upon entering the room this am, pt very agitated upon assessment when RN told pt his scheduled I&D oral surgery is scheduled for 1500 today. Pt agitated due to NPO status, RN explained reasoning for NPO before surgery. Pt hitting table with call light and being disruptive towards neighbor. Charge RN notified, Charge RN deescalated the situation.    Wound RN working with bed 1, notified this RN that bed 2 was slamming his call light again on table and was agitated. Charge RN and MD made aware, MD and Charge RN discussed with pt inappropriate behavior will not be tolerated, and explained how procedures are scheduled in the OR. Pt more pleasant after nicotine patch was placed and pain medication administered. Friend at bedside.   Pt is A&Ox4, medicated per MAR for pain and given sips with medication. No additional needs at this time, hourly rounding in place.

## 2018-10-05 NOTE — OP REPORT
Operative Note    10/5/2018     Patient ID:   Name:             Harman Denise   YOB: 1990  Age:                 27 y.o.  male   MRN:               [id number]                                                      PreOp: Diagnosis: Left Buccal space abscess, and abscessed teeth numbers 3, 5, 7, 14, 17,18, 19    PostOp Diagnosis: Same    Procedure(s):  Left buccal space ABSCESS INCISION AND DRAINAGE - Wound Class: Dirty or Infected  DENTAL EXTRACTION(S) - Wound Class: Dirty or Infected Abscessed teeth numbers 3, 5, 7, 14, 17,18, 19, left buccal space abscess    OPERATION:   After a thorough discussion about the risk benefits and alternatives to the procedure the pt gave written and verbal consent for the procedure. Left buccal space ABSCESS INCISION AND DRAINAGE - Wound Class: Dirty or Infected  DENTAL EXTRACTION(S) - Wound Class: Dirty or Infected.  Next, pt was brought back to the OR in supine position. Anesthesia brought the patient to adequate sedation and then intubated the patient via endotracheal technique. Please see their notes for complete details.  Next the patient was prepped and draped as usual for a procedure in the OR.  Local anesthesia was deposited via routine fashion.  Incision was made in the gingiva adjacent teeth numbers 3, 5, 7, 14, 17,18, 19 full thickness mucoperiosteal flap was laid.  Purulence was drained from this area. Teeth numbers 3, 5, 7, 14, 17,18, 19,  were then surgically removed.  Currette was used to currette out the extraction sockets. There were no complications. Pt tolerated the procedure well.  Pt was discharged to the OR in a stable condition.     Surgeon(s):  Oren Fuller D.M.D.,M.D.    Anesthesiologist/Type of Anesthesia: Anesthesiologist: Ramos Hewitt M.D.    Specimen: none    Estimated Blood Loss: minimal    FLUIDS: approx 500ml normal saline    Findings: consistent with diagnosis    Dressings: NONE    Drains: NONE    Complications: none

## 2018-10-06 VITALS
HEART RATE: 78 BPM | RESPIRATION RATE: 20 BRPM | TEMPERATURE: 97.8 F | OXYGEN SATURATION: 94 % | DIASTOLIC BLOOD PRESSURE: 60 MMHG | BODY MASS INDEX: 32.96 KG/M2 | SYSTOLIC BLOOD PRESSURE: 121 MMHG | WEIGHT: 235.45 LBS | HEIGHT: 71 IN

## 2018-10-06 PROCEDURE — 700111 HCHG RX REV CODE 636 W/ 250 OVERRIDE (IP): Performed by: HOSPITALIST

## 2018-10-06 PROCEDURE — 700102 HCHG RX REV CODE 250 W/ 637 OVERRIDE(OP): Performed by: FAMILY MEDICINE

## 2018-10-06 PROCEDURE — 99239 HOSP IP/OBS DSCHRG MGMT >30: CPT | Performed by: FAMILY MEDICINE

## 2018-10-06 PROCEDURE — A9270 NON-COVERED ITEM OR SERVICE: HCPCS | Performed by: FAMILY MEDICINE

## 2018-10-06 PROCEDURE — 700105 HCHG RX REV CODE 258: Performed by: HOSPITALIST

## 2018-10-06 RX ORDER — HYDROCODONE BITARTRATE AND ACETAMINOPHEN 10; 325 MG/1; MG/1
1 TABLET ORAL EVERY 6 HOURS PRN
Qty: 20 TAB | Refills: 0 | Status: SHIPPED | OUTPATIENT
Start: 2018-10-06 | End: 2018-10-10

## 2018-10-06 RX ORDER — PENICILLIN V POTASSIUM 500 MG/1
500 TABLET ORAL 4 TIMES DAILY
Qty: 20 TAB | Refills: 0 | Status: SHIPPED | OUTPATIENT
Start: 2018-10-06 | End: 2018-10-11

## 2018-10-06 RX ADMIN — AMPICILLIN AND SULBACTAM 3 G: 2; 1 INJECTION, POWDER, FOR SOLUTION INTRAVENOUS at 04:04

## 2018-10-06 RX ADMIN — HYDROCODONE BITARTRATE AND ACETAMINOPHEN 1 TABLET: 10; 325 TABLET ORAL at 08:09

## 2018-10-06 RX ADMIN — NICOTINE 14 MG: 14 PATCH, EXTENDED RELEASE TRANSDERMAL at 04:04

## 2018-10-06 ASSESSMENT — ENCOUNTER SYMPTOMS
HEARTBURN: 0
PALPITATIONS: 0
HEMOPTYSIS: 0
VOMITING: 0
NAUSEA: 0
COUGH: 0
DIARRHEA: 0
BLURRED VISION: 0
FEVER: 0
DOUBLE VISION: 0
WEIGHT LOSS: 0
ABDOMINAL PAIN: 0
ORTHOPNEA: 0
SPUTUM PRODUCTION: 0
CHILLS: 0

## 2018-10-06 ASSESSMENT — PAIN SCALES - GENERAL: PAINLEVEL_OUTOF10: 7

## 2018-10-06 NOTE — PROGRESS NOTES
Pt c/o not having any food ordered prashanth INTERIANO got diet order advance as tolerated. He is also c/o about having to stay here. He keep stating I have to go I have to much going on at home, I dont understand why I have to stay. Explained why he shouldn't leave at this point he is agreeing to stay. Will continue to monitor.

## 2018-10-06 NOTE — PROGRESS NOTES
"Received bedside report in SBAR format from night shift RN, Faviola. Head to toe assessment complete. Patient is irritable this morning stating \"no one is giving me the truth, I was told I would be out of here by now and I have no idea what's going on\". RN explained to pt that his oral surgery was more invasive than originally planned as 7 teeth were extracted, RN discussed the need for pt to be on IV antibiotics, pt acknowledges understanding and states he appreciated being kept in the loop. Pt is A&O by 4. Pt states he has pain 7/10 in his left lower jaw, pain medication administered and due Q6. Pt LOW fall risk-up self without the need for assistance, no alarm indicated, call light and belongings within reach and bedside commitment in place. Pt educated on the use of the call light for assistance. To discuss discharge plan with MD when available. Will continue to monitor.           "

## 2018-10-06 NOTE — CARE PLAN
Problem: Infection  Goal: Will remain free from infection  Outcome: PROGRESSING AS EXPECTED  Pt had 7 teeth extracted on 10/5/18. Pt receiving IV Unasyn for infection prevention.Hand hygiene in place for RN and staff. Pt educated on the need to perform hand hygiene.    Problem: Pain Management  Goal: Pain level will decrease to patient's comfort goal  Outcome: PROGRESSING AS EXPECTED  Pt states pain 7/10 on morning medication pass. Norco available for pain Q6. Pt educated on the need to inform RN and staff if pain gets worse.

## 2018-10-06 NOTE — CARE PLAN
Problem: Knowledge Deficit  Goal: Knowledge of disease process/condition, treatment plan, diagnostic tests, and medications will improve  Outcome: PROGRESSING AS EXPECTED  Pt educated about plan of care and need for overnight observation. Pt agreeable and given verbal understanding.     Problem: Pain Management  Goal: Pain level will decrease to patient's comfort goal  Outcome: PROGRESSING AS EXPECTED  Pt medicated per MAR and has no pain needs at this time.

## 2018-10-06 NOTE — DISCHARGE SUMMARY
Discharge Summary    CHIEF COMPLAINT ON ADMISSION  Chief Complaint   Patient presents with   • Oral Swelling     x2 days, L upper jaw, broken tooth       Reason for Admission  Abcess     Admission Date  10/4/2018    CODE STATUS  Full Code    HPI & HOSPITAL COURSE  This is a 27 y.o. male here with no known prior medical history of any medical problems presented to hospital on October 4, 2018 for left maxillary area pain and swelling.  Patient was found to have left buccal abscess with abscessed teeth 3, 7, 14, 17, 18, 19.    Patient was taken to the operative room and patient underwent for incision and drainage done by oral maxillofacial surgeon.  Patient had dental extraction for #3, 5, 7, 14, 17, 18, 19 and IND was cleared by operative.  Patient was kept on IV Unasyn from admission time.    I discussed the plan of care with oral surgery.  Recommendations are to continue oral antibiotics for 5 days and follow-up with Dr. Oren Cat office in 1 week for a procedure follow-up and obtain outpatient syringes to clean the left buccal area.  This was explained to the patient.  Phone number of oral maxillofacial surgeon office were given to the patient.  Patient is tolerating regular diet.  Patient will be discharged home today       Therefore, he is discharged in good and stable condition to home with close outpatient follow-up.    The patient recovered much more quickly than anticipated on admission.    Discharge Date  10/6/2018    FOLLOW UP ITEMS POST DISCHARGE  Follow up with Oral Surgery in 1 week.     DISCHARGE DIAGNOSES  Active Problems:    Dental abscess POA: Yes  Resolved Problems:    * No resolved hospital problems. *      FOLLOW UP     Oren Fuller DMD,MMOISES  [address] [facility] [address]  [facility] [address]  [phone]    Call  Call Dr. Fuller at [phone] to get syringes for oral care of extracted teeth.       MEDICATIONS ON DISCHARGE     Medication List      START taking these medications       Instructions   HYDROcodone/acetaminophen  MG Tabs  Commonly known as:  NORCO   Take 1 Tab by mouth every 6 hours as needed for up to 20 doses.  Dose:  1 Tab     penicillin v potassium 500 MG Tabs  Commonly known as:  VEETID   Take 1 Tab by mouth 4 times a day for 5 days.  Dose:  500 mg        STOP taking these medications    acetaminophen 500 MG Tabs  Commonly known as:  TYLENOL     ibuprofen 200 MG Tabs  Commonly known as:  MOTRIN            Allergies  No Known Allergies    DIET  Orders Placed This Encounter   Procedures   • Diet Order Regular     Standing Status:   Standing     Number of Occurrences:   1     Order Specific Question:   Diet:     Answer:   Regular [1]     Order Specific Question:   Texture/Fiber modifications:     Answer:   Dysphagia 3(Mechanical Soft)specify fluid consistency(question 6) [3]     Order Specific Question:   Consistency/Fluid modifications:     Answer:   Thin Liquids [3]       ACTIVITY  As tolerated.  Weight bearing as tolerated    CONSULTATIONS  Oral Surgery    PROCEDURES  Left buccal space ABSCESS INCISION AND DRAINAGE - Wound Class: Dirty or Infected  DENTAL EXTRACTION(S) - Wound Class: Dirty or Infected Abscessed teeth numbers 3, 5, 7, 14, 17,18, 19, left buccal space abscess    LABORATORY  Lab Results   Component Value Date    SODIUM 139 10/05/2018    POTASSIUM 3.7 10/05/2018    CHLORIDE 106 10/05/2018    CO2 24 10/05/2018    GLUCOSE 107 (H) 10/05/2018    BUN 12 10/05/2018    CREATININE 0.98 10/05/2018        Lab Results   Component Value Date    WBC 5.8 10/05/2018    HEMOGLOBIN 13.0 (L) 10/05/2018    HEMATOCRIT 39.7 (L) 10/05/2018    PLATELETCT 238 10/05/2018        Total time of the discharge process exceeds 40 minutes.

## 2018-10-06 NOTE — DISCHARGE INSTRUCTIONS
Discharge Instructions    Discharged to home by car with relative. Discharged via walking, hospital escort: Refused.  Special equipment needed: Not Applicable    Be sure to schedule a follow-up appointment with your primary care doctor or any specialists as instructed.     Discharge Plan:   Smoking Cessation Offered: Patient Counseled  Influenza Vaccine Indication: Patient Refuses    I understand that a diet low in cholesterol, fat, and sodium is recommended for good health. Unless I have been given specific instructions below for another diet, I accept this instruction as my diet prescription.   Other diet: Regular    Special Instructions: None    · Is patient discharged on Warfarin / Coumadin?   No   Penicillin V tablets  What is this medicine?  PENICILLIN V (pen i SILL in V) is a penicillin antibiotic. It is used to treat certain kinds of bacterial infections. It will not work for colds, flu, or other viral infections.  This medicine may be used for other purposes; ask your health care provider or pharmacist if you have questions.  COMMON BRAND NAME(S): Beepen VK, Veetids  What should I tell my health care provider before I take this medicine?  They need to know if you have any of these conditions:  -asthma  -bowel disease, like colitis  -eczema  -kidney disease  -an unusual or allergic reaction to penicillin, cephalosporins, other antibiotics or medicines, foods, tartrazine or other dyes, or preservatives  -pregnant or trying to get pregnant  -breast-feeding  How should I use this medicine?  Take this medicine by mouth with a full glass of water. Follow the directions on the prescription label. Take your medicine at regular intervals. Do not take your medicine more often than directed. Take all of your medicine as directed even if you think your are better. Do not skip doses or stop your medicine early.  Talk to your pediatrician regarding the use of this medicine in children. While this drug may be prescribed  for selected conditions, precautions do apply.  Overdosage: If you think you have taken too much of this medicine contact a poison control center or emergency room at once.  NOTE: This medicine is only for you. Do not share this medicine with others.  What if I miss a dose?  If you miss a dose, take it as soon as you can. If it is almost time for your next dose, take only that dose. Do not take double or extra doses.  What may interact with this medicine?  -birth control pills  -methotrexate  -other antibiotics  -probenecid  -some vaccines  This list may not describe all possible interactions. Give your health care provider a list of all the medicines, herbs, non-prescription drugs, or dietary supplements you use. Also tell them if you smoke, drink alcohol, or use illegal drugs. Some items may interact with your medicine.  What should I watch for while using this medicine?  Tell your doctor or health care professional if your symptoms do not improve.  Do not treat diarrhea with over the counter products. Contact your doctor if you have diarrhea that lasts more than 2 days or if it is severe and watery.  If you have diabetes, you may get a false-positive result for sugar in your urine. Check with your doctor or health care professional.  Birth control pills may not work properly while you are taking this medicine. Talk to your doctor about using an extra method of birth control.  What side effects may I notice from receiving this medicine?  Side effects that you should report to your doctor or health care professional as soon as possible:  -allergic reactions like skin rash or hives, swelling of the face, lips, or tongue  -breathing problems  -fever  -new symptoms of infection  -redness, blistering, peeling or loosening of the skin, including inside the mouth  -unusually weak or tired  Side effects that usually do not require medical attention (report to your doctor or health care professional if they continue or are  bothersome):  -diarrhea  -headache  -nausea, vomiting  -sore mouth or tongue  -stomach upset  This list may not describe all possible side effects. Call your doctor for medical advice about side effects. You may report side effects to FDA at 9-209-QZW-2318.  Where should I keep my medicine?  Keep out of the reach of children.  Store at room temperature between 15 and 30 degrees C (59 and 86 degrees F). Keep container tightly closed. Throw away any unused medicine after the expiration date.  NOTE: This sheet is a summary. It may not cover all possible information. If you have questions about this medicine, talk to your doctor, pharmacist, or health care provider.  © 2018 Elsevier/Gold Standard (2009-07-16 12:59:13)      Dental Extraction  A dental extraction is the removal (extraction) of a tooth. You may need to have a dental extraction if:  · You have tooth decay or gum disease.  · You have an infection (abscess).  · Room needs to be made for other teeth to grow in or to be aligned properly.  · Baby (primary) teeth are preventing adult (permanent) teeth from coming to the surface (erupting).  · You have a tooth fracture or fractures that are not repairable.  · You are going to be having radiation to your head and neck.  The type and length of procedure that you have depends on the reason for the extraction and the placement of the tooth or teeth that are being removed. The procedure may be:  · A simple extraction. This is done if the tooth is visible in the mouth and is above the gumline.  · A surgical extraction. This is done if the tooth has not come into the mouth or if the tooth is broken off below the gumline.  Tell a health care provider about:  · Any allergies you have.  · All medicines you are taking, including vitamins, herbs, eye drops, creams, and over-the-counter medicines.  · Any problems you or family members have had with anesthetic medicines.  · Any blood disorders you have.  · Any surgeries you  have had.  · Any medical conditions you have.  What are the risks?  Generally, this is a safe procedure. However, problems may occur, including:  · Damage to surrounding teeth, nerves, tissues, or structures.  · The blood clot does not form or stay in place where the tooth was removed. This causes the bones and nerves underneath to be exposed (dry socket). This can delay healing.  · Incomplete extraction of roots.  · Jawbone injury, pain, or weakness.  What happens before the procedure?  · Ask your health care provider about:  ¨ Changing or stopping your regular medicines. This is especially important if you are taking diabetes medicines or blood thinners.  ¨ Taking medicines such as aspirin and ibuprofen. These medicines can thin your blood. Do not take these medicines before your procedure if your health care provider instructs you not to.  · Take medicines, such as antibiotic medicines, as directed by your health care provider.  · Follow instructions from your health care provider about eating or drinking restrictions.  · Plan to have someone take you home after the procedure.  · If you go home right after the procedure, plan to have someone with you for 24 hours.  What happens during the procedure?  · You may be given one or more of the following:  ¨ A medicine that helps you relax (sedative).  ¨ A medicine that numbs the area (local anesthetic).  ¨ A medicine that makes you fall asleep (general anesthetic).  · If you are having a simple extraction:  ¨ Your dentist will loosen the tooth with an instrument called an elevator.  ¨ Another instrument called forceps will be used to grasp the tooth and remove it from the socket.  ¨ The open socket will be cleaned.  ¨ Gauze will be placed in the socket to reduce bleeding.  · If you are having a surgical extraction:  ¨ Your dentist will make an incision in the gum.  ¨ Some of the bone around the tooth may need to be removed.  ¨ The tooth will be removed.  ¨ Stitches  (sutures) may be required to close the area.  The procedure may vary among health care providers and hospitals.  What happens after the procedure?  · You may have gauze in your mouth where the tooth was removed. If directed by your health care provider, apply gentle pressure on the gauze for up to one hour after the procedure. This will help to control bleeding.  · A blood clot should begin to form over the open socket. This is normal. Do not touch the area, and do not rinse it.  · You may be given medicines to help control pain and help your recovery.  This information is not intended to replace advice given to you by your health care provider. Make sure you discuss any questions you have with your health care provider.  Document Released: 12/18/2006 Document Revised: 05/25/2017 Document Reviewed: 12/14/2015  [employer] Interactive Patient Education © 2017 [employer]    Cellulitis, Adult  Introduction  Cellulitis is a skin infection. The infected area is usually red and sore. This condition occurs most often in the arms and lower legs. It is very important to get treated for this condition.  Follow these instructions at home:  · Take over-the-counter and prescription medicines only as told by your doctor.  · If you were prescribed an antibiotic medicine, take it as told by your doctor. Do not stop taking the antibiotic even if you start to feel better.  · Drink enough fluid to keep your pee (urine) clear or pale yellow.  · Do not touch or rub the infected area.  · Raise (elevate) the infected area above the level of your heart while you are sitting or lying down.  · Place warm or cold wet cloths (warm or cold compresses) on the infected area. Do this as told by your doctor.  · Keep all follow-up visits as told by your doctor. This is important. These visits let your doctor make sure your infection is not getting worse.  Contact a doctor if:  · You have a fever.  · Your symptoms do not get better after 1-2 days of  treatment.  · Your bone or joint under the infected area starts to hurt after the skin has healed.  · Your infection comes back. This can happen in the same area or another area.  · You have a swollen bump in the infected area.  · You have new symptoms.  · You feel ill and also have muscle aches and pains.  Get help right away if:  · Your symptoms get worse.  · You feel very sleepy.  · You throw up (vomit) or have watery poop (diarrhea) for a long time.  · There are red streaks coming from the infected area.  · Your red area gets larger.  · Your red area turns darker.  This information is not intended to replace advice given to you by your health care provider. Make sure you discuss any questions you have with your health care provider.  Document Released: 06/05/2009 Document Revised: 05/25/2017 Document Reviewed: 10/26/2016  © 2017 Elsevier    Dental Dry Socket  Introduction  Dry socket is a condition that can happen after a tooth is pulled. When a tooth gets pulled, it leaves a hole (socket). Normally, blood fills up the hole and hardens (clots). That protects the bone and nerves underneath. Dry socket happens if blood gets removed from the hole or does not fill the hole. This can cause pain because the bone and nerves are not protected.  Follow these instructions at home:  · Take over-the-counter and prescription medicines only as told by your dentist or doctor.  · If you were prescribed an antibiotic medicine, take it as told by your dentist or doctor. Do not stop taking it even if you start to feel better.  · Brush and floss your teeth every morning and night.  · Do not drink bubbly (carbonated) drinks.  · Do not drink alcohol.  · Do not use tobacco products. These include cigarettes, chewing tobacco, or e-cigarettes. If you need help quitting, ask your doctor.  · Keep all follow-up visits as told by your dentist or doctor. This is important.  Contact a doctor if:  · You have pain that is not helped by  medicine.  · You have a fever.  · Your mouth becomes tender and swollen.  Get help right away if:  · You have very bad pain or a lot of swelling.  · You have bleeding that will not stop.  · You have trouble swallowing.  · You cannot open your mouth.  This information is not intended to replace advice given to you by your health care provider. Make sure you discuss any questions you have with your health care provider.  Document Released: 12/18/2006 Document Revised: 05/25/2017 Document Reviewed: 08/01/2016  © 2017 Elsevier      Depression / Suicide Risk    As you are discharged from this [facility] facility, it is important to learn how to keep safe from harming yourself.    Recognize the warning signs:  · Abrupt changes in personality, positive or negative- including increase in energy   · Giving away possessions  · Change in eating patterns- significant weight changes-  positive or negative  · Change in sleeping patterns- unable to sleep or sleeping all the time   · Unwillingness or inability to communicate  · Depression  · Unusual sadness, discouragement and loneliness  · Talk of wanting to die  · Neglect of personal appearance   · Rebelliousness- reckless behavior  · Withdrawal from people/activities they love  · Confusion- inability to concentrate     If you or a loved one observes any of these behaviors or has concerns about self-harm, here's what you can do:  · Talk about it- your feelings and reasons for harming yourself  · Remove any means that you might use to hurt yourself (examples: pills, rope, extension cords, firearm)  · Get professional help from the community (Mental Health, Substance Abuse, psychological counseling)  · Do not be alone:Call your Safe Contact- someone whom you trust who will be there for you.  · Call your local CRISIS HOTLINE 745-3581 or 906-656-2011  · Call your local Children's Mobile Crisis Response Team [place] (418) 125-8662 or www.[employer]  · Call the toll free  National Suicide Prevention Hotlines   · National Suicide Prevention Lifeline 119-633-ZKOH (7942)  · National Hope Line Network 800-SUICIDE (308-8011)

## 2018-10-06 NOTE — OR NURSING
POSTOP LEFT BUCCAL ABSCESS WITH MULTIPLE TEETH EXTRACTED     PT ARRIVED SLEEPY, ORAL SURGICAL SITE CDI, NO BLOODY SALIVA, OR DROOL OBSERVED, NO GURGLING IN BACK OF THROAT, LEFT CHEEK SWOLLEN, ICE PACK APPLIED AND TIED AROUND HEAD. ONCE PT MORE AWAKE, REPORTED SURGICAL SITE DISCOMFORT, PIV FLUSHED AND WAS UNUSABLE EVEN WITH DRESSING TAKEDOWN, SURGICAL PIV D/CD WITH TIP INTACT, NEW PIV PLACED, FLUSHED AND PAIN MEDICINE GIVEN.    PT REMAINED VSS, A & O X 4, ORAL CAVITY REMAINED CDI, NO BLOODY DRAINAGE OBSERVED DURING RECOVERY PERIOD. PT DENIED NAUSEA, PAIN ADEQUATELY MANAGED, REPORT CALLED TO SURG RN PER FLOW, CHART TAKEN TO FLOOR. ATTEMPT WAS MADE X 2 TO UPDATE FAMILY, NOT PRESENT IN SURGICAL WAITING AREA NOR IN FLOOR ROOM. PT MET ASPAN PHASE I CRITERIA AND IS APPROPRIATE TO RETURN TO ROOM.

## 2018-10-06 NOTE — PROGRESS NOTES
Page out to DR. Fuller for updates on pt discharge plan, diet and iv antibiotics. Dr. Zhu requesting Dr. Fuller call him with updates.

## 2018-10-06 NOTE — PROGRESS NOTES
"Pt discharged home, mother downstairs to give pt ride.Pt refusing the need for wheelchair down. IV removed prior to discharge, tip intact.Pt given discharge instructions specifically involving penicillin information and what to monitor for, prevention of dry socket as well as information about oral care. Pt requesting toothbrush and mouthwash, given. Pt states he has \"brass knuckles\" in safekeeping, pt given these back. Paper prescriptions discussed and given. All questions discussed and answered. Pt given number for  office so he can get oral syringes, pt acknowledges understanding. Charge RN aware of discharge.  "

## 2018-10-06 NOTE — PROGRESS NOTES
[facility]ist Progress Note    Date of Service: 10/6/2018    Chief Complaint  27 y.o. male admitted 10/4/2018 with dental abscess and left upper maxilla involvement.    Interval Problem Update  Currently on clear liquid diet.  Status post surgery.  Asking me that when he can go home.  No fever no chills.    Consultants/Specialty  Oral maxillofacial surgery    Disposition  Home after surgery        Review of Systems   Constitutional: Negative for chills, fever and weight loss.   HENT: Negative for ear discharge, hearing loss, nosebleeds and tinnitus.    Eyes: Negative for blurred vision and double vision.   Respiratory: Negative for cough, hemoptysis and sputum production.    Cardiovascular: Negative for chest pain, palpitations and orthopnea.   Gastrointestinal: Negative for abdominal pain, diarrhea, heartburn, nausea and vomiting.   Genitourinary: Negative for dysuria, frequency and urgency.   Skin: Negative for itching and rash.      Physical Exam  Laboratory/Imaging   Hemodynamics  Temp (24hrs), Av.8 °C (98.2 °F), Min:36.5 °C (97.7 °F), Max:37 °C (98.6 °F)   Temperature: 36.6 °C (97.8 °F)  Pulse  Av.2  Min: 62  Max: 117 Heart Rate (Monitored): 87  Blood Pressure: 121/60, NIBP: 121/48      Respiratory      Respiration: 20, Pulse Oximetry: 94 %        RUL Breath Sounds: Clear, RML Breath Sounds: Clear, RLL Breath Sounds: Diminished, HILARIO Breath Sounds: Clear, LLL Breath Sounds: Diminished    Fluids    Intake/Output Summary (Last 24 hours) at 10/06/18 0913  Last data filed at 10/06/18 0800   Gross per 24 hour   Intake             1400 ml   Output               50 ml   Net             1350 ml       Nutrition  Orders Placed This Encounter   Procedures   • Diet Order Regular     Standing Status:   Standing     Number of Occurrences:   1     Order Specific Question:   Diet:     Answer:   Regular [1]     Order Specific Question:   Texture/Fiber modifications:     Answer:   Dysphagia 3(Mechanical Soft)specify  fluid consistency(question 6) [3]     Order Specific Question:   Consistency/Fluid modifications:     Answer:   Thin Liquids [3]     Physical Exam   Constitutional: He appears well-developed and well-nourished. No distress.   HENT:   Head: Normocephalic and atraumatic.   Right Ear: External ear normal.   Swelling on the left side is significantly decreased and patient has a facial symmetry now.   Neck: Normal range of motion. Neck supple. No tracheal deviation present. No thyromegaly present.   Cardiovascular: Normal rate, regular rhythm, normal heart sounds and intact distal pulses.  Exam reveals no friction rub.    No murmur heard.  Pulmonary/Chest: Effort normal and breath sounds normal. No respiratory distress. He has no wheezes. He has no rales.   Abdominal: Soft. Bowel sounds are normal. He exhibits no distension. There is no tenderness. There is no rebound.   Skin: He is not diaphoretic.       Recent Labs      10/04/18   1135  10/05/18   0254   WBC  7.1  5.8   RBC  4.54*  4.21*   HEMOGLOBIN  13.5*  13.0*   HEMATOCRIT  41.8*  39.7*   MCV  92.1  94.3   MCH  29.7  30.9   MCHC  32.3*  32.7*   RDW  43.1  43.9   PLATELETCT  296  238   MPV  10.0  10.2     Recent Labs      10/04/18   1135  10/05/18   0254   SODIUM  139  139   POTASSIUM  3.9  3.7   CHLORIDE  106  106   CO2  26  24   GLUCOSE  108*  107*   BUN  11  12   CREATININE  0.90  0.98   CALCIUM  9.9  8.9                      Assessment/Plan     Dental abscess- (present on admission)   Assessment & Plan       Status post oral surgery.  Operative report is reviewed awaiting oral maxillofacial surgery recommendation for discharge planning.  Continue IV antibiotics for now.          Quality-Core Measures   Atkins catheter::  No Atkins  DVT prophylaxis pharmacological::  Enoxaparin (Lovenox)  Antibiotics:  Treating active infection/contamination beyond 24 hours perioperative coverage

## 2018-10-06 NOTE — PROGRESS NOTES
Pt requesting to go charge his phone at phone kiosk on [place] floor. IV removed prior to this as pt has hxof drug abuse. Pt aware that he will not be getting paid medications if he leaves AMA, pt states he will return once phone is charged.

## 2018-10-07 NOTE — PROGRESS NOTES
Eva at [facility] pharmacy where pt Rx sent called unit to ask for a # of days on [facility] Rx.  MD Zhu paged for clarification.      Update 1049: MD Zhu returned page and verified 5 day supply (Q6 x20 doses).  Eva at [facility] on [address] was contacted and this RX was verified.

## 2019-09-29 ENCOUNTER — HOSPITAL ENCOUNTER (EMERGENCY)
Facility: MEDICAL CENTER | Age: 29
End: 2019-09-29
Attending: EMERGENCY MEDICINE
Payer: MEDICAID

## 2019-09-29 VITALS
OXYGEN SATURATION: 98 % | SYSTOLIC BLOOD PRESSURE: 130 MMHG | TEMPERATURE: 97.5 F | DIASTOLIC BLOOD PRESSURE: 74 MMHG | RESPIRATION RATE: 16 BRPM | HEIGHT: 70 IN | HEART RATE: 80 BPM | BODY MASS INDEX: 37.02 KG/M2 | WEIGHT: 258.6 LBS

## 2019-09-29 DIAGNOSIS — K04.7 DENTAL ABSCESS: ICD-10-CM

## 2019-09-29 PROCEDURE — 303977 HCHG I & D

## 2019-09-29 PROCEDURE — 99283 EMERGENCY DEPT VISIT LOW MDM: CPT

## 2019-09-29 RX ORDER — AMOXICILLIN 500 MG/1
500 CAPSULE ORAL 3 TIMES DAILY
Qty: 21 CAP | Refills: 0 | Status: SHIPPED | OUTPATIENT
Start: 2019-09-29 | End: 2019-10-06

## 2019-09-29 RX ORDER — HYDROCODONE BITARTRATE AND ACETAMINOPHEN 5; 325 MG/1; MG/1
1 TABLET ORAL EVERY 6 HOURS PRN
Qty: 9 TAB | Refills: 0 | Status: SHIPPED | OUTPATIENT
Start: 2019-09-29 | End: 2019-10-03

## 2019-09-29 ASSESSMENT — ENCOUNTER SYMPTOMS
FEVER: 0
VOMITING: 0
COUGH: 0

## 2019-09-29 NOTE — ED PROVIDER NOTES
ED Provider Note    Scribed for Belen Goodrich D.O. by Jacinta Sandoval. 9/29/2019, 9:42 AM.    Primary care provider: Pcp Pt States None  Means of arrival: Walk-In  History obtained from: Patient  History limited by: None    CHIEF COMPLAINT  Chief Complaint   Patient presents with   • Dental Pain     LT lower dental pain. started swelling up this morning.    • Tooth Abscess     hx of same      HPI  Harman Denise is a 28 y.o. male who presents to the Emergency Department complaining of left lower dental pain onset last night. Associated symptoms include facial swelling starting this morning. Patient has not yet followed up with a dentist and has an appointment scheduled for 3 months from now. Denies fever, cough, or vomiting. Patient denies any other medical issues or allergies to medications.     REVIEW OF SYSTEMS  Review of Systems   Constitutional: Negative for fever.   HENT: Negative for congestion.         Positive for dental pain   Respiratory: Negative for cough.    Gastrointestinal: Negative for vomiting.     PAST MEDICAL HISTORY    History reviewed. No pertinent past medical history.    SURGICAL HISTORY   has a past surgical history that includes laceration repair (6/2/2014); foreign body removal (6/2/2014); and submandible abscess incision and drainage (Left, 10/5/2018).    SOCIAL HISTORY  Social History     Tobacco Use   • Smoking status: Current Every Day Smoker     Packs/day: 1.00     Types: Cigarettes   • Smokeless tobacco: Never Used   Substance Use Topics   • Alcohol use: Yes     Comment: occ   • Drug use: Yes     Types: Inhaled     Comment: marijuana      Social History     Substance and Sexual Activity   Drug Use Yes   • Types: Inhaled    Comment: marijuana       FAMILY HISTORY  No family history on file.    CURRENT MEDICATIONS  Home Medications     Reviewed by Michael Cruz R.N. (Registered Nurse) on 09/29/19 at 0915  Med List Status: Partial   Medication Last Dose Status        Patient Jayro  "Taking any Medications                       ALLERGIES  No Known Allergies    PHYSICAL EXAM  VITAL SIGNS: /82   Pulse 82   Temp 36.4 °C (97.5 °F) (Temporal)   Resp 16   Ht 1.778 m (5' 10\")   Wt 117.3 kg (258 lb 9.6 oz)   SpO2 100%   BMI 37.11 kg/m²     Vitals reviewed.  Constitutional: Patient is oriented to person, place, and time. Appears well-developed and well-nourished. Mild distress.    Head: Normocephalic and atraumatic.   Mouth/Throat: Oropharynx is clear and moist, no exudates. There is swelling to the buccal surface, lower left. Lower left Molars are missing. Tooth in questions is fracture. No swelling to the floor of his mouth. He is managing his own secretions.  Eyes: Conjunctivae are normal.   Cardiovascular: Normal rate, regular rhythm and normal heart sounds.   Pulmonary/Chest: Effort normal and breath sounds normal. No respiratory distress, no wheezes, rhonchi, or rales.  Lymphadenopathy: No cervical adenopathy.   Skin: Skin is warm and dry. No erythema. No pallor.   Psychiatric: Patient has a normal mood and affect.      PROCEDURES  Incision and Drainage Procedure Note    Indication: Dental abscess    Procedure: The patient was positioned appropriately and the skin over the incision site was anesthetized by infiltration using 1% Lidocaine without epinephrine.  An incision was then made over the apex of the lesion and no obvious purulent material was expressed.     The patient tolerated the procedure well.    Complications: None      COURSE & MEDICAL DECISION MAKING  Nursing notes, VS, PMSFHx reviewed in chart.    Obtained and reviewed past medical records from 10/2018 which indicated he was admitted with multiple dental abscesses and had 7 teeth removed.    9:42 AM - Patient seen and examined at bedside. Informed the patient that there is high concern for dental abscess with swelling to this area and this may be amenable to incision and drainage.  Patient is agreeable to this plan of " care.  Plan of care was discussed with the patient which includes draining his abscess and treating him with antibiotics. Patient verbalized his understanding and agrees with the plan of care.     10:44 AM - Performed I&D procedure at this time.  She was concerned that his swelling did not decrease lately and I have advised, that we would not expect this.  Will be treated with antibiotics and pain medication.  He is advised to follow-up with the dentist as soon as possible.  He is given strict return precautions.    The patient will return for new or worsening symptoms and is stable at the time of discharge.    The patient is referred to a primary physician for blood pressure management, diabetic screening, and for all other preventative health concerns.      DISPOSITION:  Patient will be discharged home in stable condition.    FOLLOW UP:  [facility], Emergency Dept  [address] [place] [id number] [phone]    If symptoms worsen      See Referal page for options for follow-up for dental care.          OUTPATIENT MEDICATIONS:  Discharge Medication List as of 9/29/2019 11:05 AM      START taking these medications    Details   HYDROcodone-acetaminophen (NORCO) 5-325 MG Tab per tablet Take 1 Tab by mouth every 6 hours as needed for up to 9 doses., Disp-9 Tab, R-0, Print Rx Paper, For 9 doses      amoxicillin (AMOXIL) 500 MG Cap Take 1 Cap by mouth 3 times a day for 7 days., Disp-21 Cap, R-0, Print Rx Paper               FINAL IMPRESSION  Performed I&D procedure, see above.  1. Dental abscess          Jacinta CHAVIRA (Jimenez), am scribing for, and in the presence of, Belen Goodrich D.O..    Electronically signed by: Jacinta Sandoval (Jimenez), 9/29/2019    Belen CHAVIRA D.O. personally performed the services described in this documentation, as scribed by Jacinta Sandoval in my presence, and it is both accurate and complete. E    The note accurately reflects work and decisions  made by me.  Belen Goodrich  9/29/2019  4:32 PM

## 2019-09-29 NOTE — ED NOTES
Pt discharged home. Pt given discharge instructions and prescriptions. Pt verbalized understanding. All questions answered. Vital signs WNL upon discharge. Pt steady on feet upon discharge. Pt refused to sign d/c instructions.

## 2019-09-29 NOTE — ED TRIAGE NOTES
"Chief Complaint   Patient presents with   • Dental Pain     LT lower dental pain. started swelling up this morning.    • Tooth Abscess     hx of same      Pt to triage for above. VSS. Denies fevers.    Pt returned to lobby. Educated on triage process and to inform staff of any changes.     /82   Pulse 82   Resp 16   Ht 1.778 m (5' 10\")   Wt 117.3 kg (258 lb 9.6 oz)   SpO2 100%   BMI 37.11 kg/m²     "

## 2020-09-07 ENCOUNTER — HOSPITAL ENCOUNTER (EMERGENCY)
Facility: MEDICAL CENTER | Age: 30
End: 2020-09-07
Attending: EMERGENCY MEDICINE | Admitting: EMERGENCY MEDICINE
Payer: MEDICAID

## 2020-09-07 VITALS
HEART RATE: 110 BPM | SYSTOLIC BLOOD PRESSURE: 122 MMHG | DIASTOLIC BLOOD PRESSURE: 74 MMHG | WEIGHT: 287.48 LBS | OXYGEN SATURATION: 95 % | TEMPERATURE: 98.1 F | BODY MASS INDEX: 40.25 KG/M2 | RESPIRATION RATE: 16 BRPM | HEIGHT: 71 IN

## 2020-09-07 DIAGNOSIS — H10.33 ACUTE CONJUNCTIVITIS OF BOTH EYES, UNSPECIFIED ACUTE CONJUNCTIVITIS TYPE: ICD-10-CM

## 2020-09-07 PROCEDURE — 99283 EMERGENCY DEPT VISIT LOW MDM: CPT

## 2020-09-07 PROCEDURE — 700101 HCHG RX REV CODE 250: Performed by: EMERGENCY MEDICINE

## 2020-09-07 RX ORDER — CIPROFLOXACIN HYDROCHLORIDE 3.5 MG/ML
1 SOLUTION/ DROPS TOPICAL
Qty: 2 ML | Refills: 0 | Status: SHIPPED | OUTPATIENT
Start: 2020-09-07 | End: 2020-09-12

## 2020-09-07 RX ORDER — CIPROFLOXACIN HYDROCHLORIDE 3.5 MG/ML
2 SOLUTION/ DROPS TOPICAL ONCE
Status: COMPLETED | OUTPATIENT
Start: 2020-09-07 | End: 2020-09-07

## 2020-09-07 RX ADMIN — CIPROFLOXACIN 2 DROP: 3 SOLUTION OPHTHALMIC at 22:50

## 2020-09-07 ASSESSMENT — FIBROSIS 4 INDEX: FIB4 SCORE: 0.54

## 2020-09-07 ASSESSMENT — PAIN DESCRIPTION - DESCRIPTORS: DESCRIPTORS: BURNING;SHARP

## 2020-09-07 NOTE — Clinical Note
Harman Denise was seen and treated in our emergency department on 9/7/2020.  He may return to work on 09/11/2020.  Patient has conjunctivitis (pink eye) which is highly contagious      If you have any questions or concerns, please don't hesitate to call.      Ty Ortiz M.D.

## 2020-09-08 NOTE — ED NOTES
Patient educated on discharge instructions, follow up appointments, prescriptions, and home care. Patient verbalized understanding. Patient refused discharge vitals. Patient ambulated to ER [facility].

## 2020-09-08 NOTE — ED TRIAGE NOTES
"Chief Complaint   Patient presents with   • Eye Pain     PT reports three days of left eye pain and redness and now watering with swelling around eye.  PT reports vission is blurred out of his left eye.     Blood Pressure 122/74   Pulse (Abnormal) 110   Temperature 36.7 °C (98.1 °F)   Respiration 16   Height 1.803 m (5' 11\")   Weight (Abnormal) 130.4 kg (287 lb 7.7 oz)   Oxygen Saturation 95%   Body Mass Index 40.10 kg/m²     "

## 2020-09-08 NOTE — ED PROVIDER NOTES
"ED Provider Note    CHIEF COMPLAINT  Chief Complaint   Patient presents with   • Eye Pain     PT reports three days of left eye pain and redness and now watering with swelling around eye.  PT reports vission is blurred out of his left eye.       HPI  Harman Denise is a 29 y.o. male who presents for 3 days of left eye pain, redness, and watering.  He has a small amount of swelling around the eye which is started today and reports that he has intermittent blurry vision when the eye gets \"goopy.\"  He notes no double vision, blurry vision, headache, facial pain, facial swelling, sore throat, runny nose.  He notes no recent exposures to sick contacts.  He does not wear contacts and has had no known chemical exposures to the eye directly.  He has not had any recent trauma and denies getting anything in his eyes.    REVIEW OF SYSTEMS  Constitutional: No fevers or chills  Skin: No rashes  HEENT: No ear pain, ringing in ears, or decreased hearing. No sore throat, runny nose, sores, trouble swallowing, trouble speaking.  Neck: No neck pain  Pulm: No shortness of breath or cough  Immuno: No hx of recurrent infections      SOCIAL HISTORY  Social History     Tobacco Use   • Smoking status: Current Every Day Smoker     Packs/day: 1.00     Types: Cigarettes   • Smokeless tobacco: Never Used   Substance and Sexual Activity   • Alcohol use: Yes     Comment: occ   • Drug use: Yes     Types: Inhaled     Comment: marijuana   • Sexual activity: Not on file       SURGICAL HISTORY   has a past surgical history that includes laceration repair (6/2/2014); foreign body removal (6/2/2014); and submandible abscess incision and drainage (Left, 10/5/2018).    CURRENT MEDICATIONS  Home Medications    **Home medications have not yet been reviewed for this encounter**         ALLERGIES  No Known Allergies    PHYSICAL EXAM  VITAL SIGNS: /74   Pulse (!) 110   Temp 36.7 °C (98.1 °F)   Resp 16   Ht 1.803 m (5' 11\")   Wt (!) 130.4 kg (287 lb " 7.7 oz)   SpO2 95%   BMI 40.10 kg/m²    Gen: Alert in no apparent distress.  Calm, conversant  HEENT: PERRLA, EOMI no signs of trauma, Bilateral external ears normal, Nose normal. Conjunctiva and sclera injected with the left greater than the right eye.  There is no perceptible lid edema and no proptosis or enophthalmos.  Extraocular eye movement is intact.  Red reflex is intact.  There are no retinal hemorrhages noted, Non-icteric.   Neck:  No tenderness, Supple, No masses  Lymphatic: No cervical lymphadenopathy noted.   Cardiovascular: Regular rate and rhythm but was noted the patient was mildly tachycardic on arrival to the emergency department.   Thorax & Lungs: No signs of respiratory distress  Neurologic: Alert , no facial droop, grossly normal coordination and strength      COURSE & MEDICAL DECISION MAKING  Patient arrives with findings highly suggestive of conjunctivitis of the left eye greater than the right eye.  Patient has no convincing findings to suggest glaucoma and has not had any recent exposure to chemicals or trauma.  I do not feel slit-lamp examination is necessary at this point.  He has no rash to suggest zoster ophthalmicus and his visual acuity was noted to be reassuring.  I do not feel he requires emergent ophthalmology consultation but I do feel it is reasonable to prescribe him antibiotic eyedrops and treat symptomatically at home.  He will need to be off work for 3 days while recovering due to the virulent nature of most cases of conjunctivitis.  He will return if his symptoms worsen or change in any way.    FINAL IMPRESSION  1. Acute conjunctivitis of both eyes, unspecified acute conjunctivitis type        Electronically signed by: Ty Ortiz M.D., 9/7/2020 10:10 PM

## 2020-11-08 ENCOUNTER — HOSPITAL ENCOUNTER (EMERGENCY)
Dept: HOSPITAL 8 - ED | Age: 30
Discharge: HOME | End: 2020-11-08
Payer: MEDICAID

## 2020-11-08 VITALS — HEIGHT: 71 IN | BODY MASS INDEX: 39.85 KG/M2 | WEIGHT: 284.62 LBS

## 2020-11-08 VITALS — SYSTOLIC BLOOD PRESSURE: 133 MMHG | DIASTOLIC BLOOD PRESSURE: 86 MMHG

## 2020-11-08 DIAGNOSIS — J02.8: Primary | ICD-10-CM

## 2020-11-08 DIAGNOSIS — B97.89: ICD-10-CM

## 2020-11-08 DIAGNOSIS — F17.200: ICD-10-CM

## 2020-11-08 PROCEDURE — 99283 EMERGENCY DEPT VISIT LOW MDM: CPT

## 2020-11-08 PROCEDURE — 87880 STREP A ASSAY W/OPTIC: CPT

## 2020-11-08 PROCEDURE — 87081 CULTURE SCREEN ONLY: CPT

## 2020-11-08 PROCEDURE — 87147 CULTURE TYPE IMMUNOLOGIC: CPT

## 2020-11-08 NOTE — NUR
PT MEDICATED PER ORDERS. C/O THROAT PAIN AND DIFFICULTY SWALLOWING AND SLEEPING 
D/T SWELLING. UNDERSTANDS POC.

## 2020-11-08 NOTE — NUR
PT VERBALIZED SOME RELIEF AFTER DECADRON. INSTRUCTED TO RETURN TO ED 
IMMEDIATELY FOR WORSENING THROAT SWELLING OR DIFFICULTY BREATHING. D/C 
INSTRUCTIONS, MEDS & F/U APPT RV'WD WITH PT, HE VERBALIZES UNDERSTANDING. RX 
GIVEN X2. BUS PASS PROVIDED TO PT. PT AMBULATED OUT OF ED WITHOUT DIFFICULTY.

## 2022-01-01 ENCOUNTER — HOSPITAL ENCOUNTER (EMERGENCY)
Facility: MEDICAL CENTER | Age: 32
End: 2022-06-14
Attending: EMERGENCY MEDICINE
Payer: MEDICAID

## 2022-01-01 DIAGNOSIS — I46.9 CARDIAC ARREST (HCC): ICD-10-CM

## 2022-01-01 PROCEDURE — 99285 EMERGENCY DEPT VISIT HI MDM: CPT

## 2022-01-01 PROCEDURE — 92950 HEART/LUNG RESUSCITATION CPR: CPT

## 2022-01-01 PROCEDURE — 94799 UNLISTED PULMONARY SVC/PX: CPT

## 2022-06-14 NOTE — RESPIRATORY CARE
Patient arrived to ED with 5.0 LMA. Continued chest compressions with manual ventilation. ETCO2 in place 17-26 mm Hg during code.

## 2022-06-14 NOTE — ED PROVIDER NOTES
ED Provider Note    Scribed for Jessica Valverde M.D. by Ashli Pop. 6/14/2022, 2:44 PM.    Primary care provider: Pcp Pt States None  Means of arrival: EMS  History obtained from: EMS   History limited by: Patient's condition    CHIEF COMPLAINT  Cardiac arrest    HPI  Harman Denise is a 31 y.o. male who presents to the Emergency Department via EMS CPR in progress after a possible Fentanyl overdose. Per EMS, the patient was last seen normal at 2:00 PM today and was later found down by family. Family states the patient took 2 hits of fentanyl.  Per EMS they arrived at the scene at 2:07 PM and patient was found with fixed and dilated pupils, unconscious and unrespon side effects, just he was treated with 2 mg of Narcan, CPR was started and patient was treated with multiple rounds of epinephrine during cardiopulmonary resuscitation.  All pulse checks demonstrated asystole.  EMS reports blood sugar in the 230s.  Patient is actively arresting, history limited by critical condition.    REVIEW OF SYSTEMS  Review of Systems   Unable to perform ROS: Acuity of condition   Cardiovascular:        Positive for asystolic.         PAST MEDICAL HISTORY   Unable to obtain given acuity of condition, below history obtained from chart review    SURGICAL HISTORY   has a past surgical history that includes laceration repair (6/2/2014); foreign body removal (6/2/2014); and submandible abscess incision and drainage (Left, 10/5/2018).    SOCIAL HISTORY  Social History     Tobacco Use   • Smoking status: Current Every Day Smoker     Packs/day: 1.00     Types: Cigarettes   • Smokeless tobacco: Never Used   Substance Use Topics   • Alcohol use: Yes     Comment: occ   • Drug use: Yes     Types: Inhaled     Comment: marijuana      Social History     Substance and Sexual Activity   Drug Use Yes   • Types: Inhaled    Comment: marijuana       FAMILY HISTORY  Unable to obtain given acuity of condition    CURRENT MEDICATIONS  Unable to obtain given  acuity of condition    ALLERGIES  No Known Allergies    PHYSICAL EXAM  VITAL SIGNS: HR 0, no spontaneous respirations, no obtainable blood pressure  Vitals reviewed by myself.    Nursing note and vitals reviewed.  Constitutional: Patient is unconscious and unresponsive  HENT: Head is normocephalic and atraumatic.  Eyes: Pupils are dilated, nonreactive to light  Cardiovascular: Chest compressions in progress, when chest compressions are held there is no detectable pulse  Pulmonary/Chest: No spontaneous respirations  Abdominal: Soft and nondistended  Musculoskeletal: Patient does not move extremities spontaneously  Neurological: Patient is unconscious and unresponsive  Skin: Skin is warm and dry. No external signs of trauma noted on exam    DIAGNOSTIC STUDIES /  LABS  none    REASSESSMENT  2:32 PM - Patient arrives via EMS CPR in progress. I was at bedside on arrival. Patient asystolic en route per EMS. Patient treated with 4 mg Narcan and epinephrine on arrival, CPR continued.     2:34 PM - Patient asystolic.    2:36 PM - Patient asystolic.     2:38 PM - Patient asystolic.    2:40 PM - Patient asystolic.  Bedside ultrasound demonstrates no cardiac activity    2:43 PM -patient is asystolic, bedside ultrasound demonstrates no cardiac activity, time of death is called    3:00PM- spoke with family, updated on clinical course, questions answered    Mr. Denise was here with a suspected overdose of Unknown substance or drug; he has no other known overdoses.      COURSE & MEDICAL DECISION MAKING  Nursing notes, VS, PMSFHx reviewed in chart.    Patient is a 31-year-old male who presented after being found down by EMS.  In the field his blood sugar was within normal limits.  He was treated with Narcan and CPR was initiated.  Despite resuscitation with epinephrine and chest compressions they were unable to regain pulses.  On arrival patient is still in cardiac arrest is pulseless, unconscious and unresponsive.  In the emergency  department he is treated with multiple rounds of cardiac compressions, epinephrine, bicarb, magnesium and calcium.  Multiple pulse checks demonstrate asystole.  Despite resuscitative efforts we were unable to regain pulses.  Bedside ultrasound confirmed no cardiac activity and time of death was ultimately called at 2:43 PM.  I discussed patient's clinical course and subsequent death with family and all questions were answered.    CRITICAL CARE  The very real possibilty of a deterioration of this patient's condition required the highest level of my preparedness for sudden, emergent intervention.  I provided critical care services, which included medication orders, frequent reevaluations of the patient's condition and response to treatment, ordering and reviewing test results, and discussing the case with various consultants.  The critical care time associated with the care of the patient was 21 minutes. Review chart for interventions. This time is exclusive of any other billable procedures.      DISPOSITION:  The patient is . Time of death 2:43 PM     FINAL IMPRESSION  1. Cardiac arrest (HCC)          Ashli CHAVIRA (Scribe), am scribing for, and in the presence of, Jessica Valverde M.D..    Electronically signed by: Ashli Pop (Jimenez), 2022    Jessica CHAVIRA M.D. personally performed the services described in this documentation, as scribed by Ashli Pop in my presence, and it is both accurate and complete.    The note accurately reflects work and decisions made by me.  eJssica Valverde M.D.  2022  3:23 PM

## 2022-06-14 NOTE — ED NOTES
Pt arrived via [facility], CPR in progress for 20 min pta  1432 epi 1 mg, pulseless, apnea, asystole  1434 Bicarb, pulse check, asystole, CPR  1435 Calcium  1436 EPI 1 mg  1437 pulse check, asystole, CPR, U/S no activity  1438 Bicarb  1440 Magnesium Sulfate 2 gm, pulse check, asystole, CPR  1441 Calcium, Epi  1443 pulse check, asystole, ultrasound heart, no activity  1443 TOD

## 2022-10-23 NOTE — ED NOTES
Med Rec Updated and Complete per Pt at bedside  Allergies Reviewed  No PO ABX last 30 days     Pharmacy Medication History Note      List of current medications patient is taking is complete. Source of information: [facility] medication list      Medications notes:   1. Medications added:    Cyclobenzaprine 5 mg TID PRN muscle spasms  2. Medications deleted: N/A   3. The following updates were made to the home medications: [Drug interaction, high risk med, med non-adherence note, clarification of dosage form or strength, clarification of directions, addition or removal of medication]  Lantus changed from 35 units daily to 40 units daily  Humalog changed from 10 units TID scheduled to sliding scale   Meclizine changed from 25 mg TID scheduled to 25 mg q8h PRN dizziness  Pregabalin changed from 25 mg BID scheduled to 25 mg q12h PRN pain  Tamsulosin changed from 0.4 mg daily to 0.8 mg daily     Thank you for the consult. Please contact me with any questions.     Tremayne Rosado, PharmD  10/23/2022 2:41 PM

## (undated) DEVICE — SUTURE 3-0 CHROMIC GUT SH 27 (36PK/BX)"

## (undated) DEVICE — KIT ROOM DECONTAMINATION

## (undated) DEVICE — SET EXTENSION WITH 2 PORTS (48EA/CA) ***PART #2C8610 IS A SUBSTITUTE*****

## (undated) DEVICE — HEAD HOLDER JUNIOR/ADULT

## (undated) DEVICE — SUTURE GENERAL

## (undated) DEVICE — SYRINGE 30 ML LL (56/BX)

## (undated) DEVICE — CANISTER SUCTION 3000ML MECHANICAL FILTER AUTO SHUTOFF MEDI-VAC NONSTERILE LF DISP  (40EA/CA)

## (undated) DEVICE — BUR 701 1.2 (ORAL)

## (undated) DEVICE — TUBING CLEARLINK DUO-VENT - C-FLO (48EA/CA)

## (undated) DEVICE — SUCTION INSTRUMENT YANKAUER BULBOUS TIP W/O VENT (50EA/CA)

## (undated) DEVICE — BITE BLOCK, DISP.

## (undated) DEVICE — KIT ANESTHESIA W/CIRCUIT & 3/LT BAG W/FILTER (20EA/CA)

## (undated) DEVICE — BLADE SURGICAL #11 - (50/BX)

## (undated) DEVICE — NEEDLE NON SAFETY HYPO 22 GA X 1 1/2 IN (100/BX)

## (undated) DEVICE — DRAPE LARGE 3 QUARTER - (20/CA)

## (undated) DEVICE — NEPTUNE 4 PORT MANIFOLD - (20/PK)

## (undated) DEVICE — TOWELS CLOTH SURGICAL - (4/PK 20PK/CA)

## (undated) DEVICE — SYRINGE 10 ML CONTROL LL (25EA/BX 4BX/CA)

## (undated) DEVICE — LACTATED RINGERS INJ 1000 ML - (14EA/CA 60CA/PF)

## (undated) DEVICE — GOWN SURGEONS X-LARGE - DISP. (30/CA)

## (undated) DEVICE — SLEEVE, VASO, THIGH, MED

## (undated) DEVICE — PAD BABY LAP 4X18 W/O - RINGS PREWASHED 5/PK 40PK/CS

## (undated) DEVICE — SET LEADWIRE 5 LEAD BEDSIDE DISPOSABLE ECG (1SET OF 5/EA)

## (undated) DEVICE — DRAPE SURGICAL U 77X120 - (10/CA)

## (undated) DEVICE — TUBE E-T HI-LO CUFF 7.5MM (10EA/PK)

## (undated) DEVICE — PACK MINOR BASIN - (2EA/CA)

## (undated) DEVICE — GOWN WARMING STANDARD FLEX - (30/CA)

## (undated) DEVICE — ELECTRODE 850 FOAM ADHESIVE - HYDROGEL RADIOTRNSPRNT (50/PK)

## (undated) DEVICE — GAUZE FLUFF STERILE 2-PLY 36 X 36 (100EA/CA)

## (undated) DEVICE — PROTECTOR ULNA NERVE - (36PR/CA)

## (undated) DEVICE — SENSOR SPO2 NEO LNCS ADHESIVE (20/BX) SEE USER NOTES

## (undated) DEVICE — MASK ANESTHESIA ADULT  - (100/CA)

## (undated) DEVICE — CONTAINER SPECIMEN BAG OR - STERILE 4 OZ W/LID (100EA/CA)